# Patient Record
Sex: MALE | Race: WHITE | Employment: OTHER | URBAN - METROPOLITAN AREA
[De-identification: names, ages, dates, MRNs, and addresses within clinical notes are randomized per-mention and may not be internally consistent; named-entity substitution may affect disease eponyms.]

---

## 2017-06-01 ENCOUNTER — APPOINTMENT (OUTPATIENT)
Dept: CT IMAGING | Facility: HOSPITAL | Age: 52
End: 2017-06-01
Attending: EMERGENCY MEDICINE
Payer: COMMERCIAL

## 2017-06-01 ENCOUNTER — HOSPITAL ENCOUNTER (OUTPATIENT)
Facility: HOSPITAL | Age: 52
Setting detail: OBSERVATION
Discharge: HOME | End: 2017-06-04
Attending: EMERGENCY MEDICINE | Admitting: HOSPITALIST
Payer: COMMERCIAL

## 2017-06-01 DIAGNOSIS — N20.1 RIGHT URETERAL STONE: ICD-10-CM

## 2017-06-01 DIAGNOSIS — N20.0 KIDNEY STONE: Primary | ICD-10-CM

## 2017-06-01 PROCEDURE — 99203 OFFICE O/P NEW LOW 30 MIN: CPT | Performed by: UROLOGY

## 2017-06-01 PROCEDURE — 74176 CT ABD & PELVIS W/O CONTRAST: CPT | Performed by: EMERGENCY MEDICINE

## 2017-06-01 PROCEDURE — 99220 INITIAL OBSERVATION CARE,LEVL III: CPT | Performed by: HOSPITALIST

## 2017-06-01 RX ORDER — ALFUZOSIN HYDROCHLORIDE 10 MG/1
10 TABLET, EXTENDED RELEASE ORAL ONCE
Status: COMPLETED | OUTPATIENT
Start: 2017-06-01 | End: 2017-06-01

## 2017-06-01 RX ORDER — BISACODYL 10 MG
10 SUPPOSITORY, RECTAL RECTAL
Status: DISCONTINUED | OUTPATIENT
Start: 2017-06-01 | End: 2017-06-04

## 2017-06-01 RX ORDER — ONDANSETRON 2 MG/ML
4 INJECTION INTRAMUSCULAR; INTRAVENOUS ONCE
Status: COMPLETED | OUTPATIENT
Start: 2017-06-01 | End: 2017-06-01

## 2017-06-01 RX ORDER — SODIUM PHOSPHATE, DIBASIC AND SODIUM PHOSPHATE, MONOBASIC 7; 19 G/133ML; G/133ML
1 ENEMA RECTAL ONCE AS NEEDED
Status: DISCONTINUED | OUTPATIENT
Start: 2017-06-01 | End: 2017-06-04

## 2017-06-01 RX ORDER — HYDROCODONE BITARTRATE AND ACETAMINOPHEN 5; 325 MG/1; MG/1
1 TABLET ORAL EVERY 4 HOURS PRN
Status: DISCONTINUED | OUTPATIENT
Start: 2017-06-01 | End: 2017-06-04

## 2017-06-01 RX ORDER — ACETAMINOPHEN 325 MG/1
650 TABLET ORAL EVERY 6 HOURS PRN
Status: DISCONTINUED | OUTPATIENT
Start: 2017-06-01 | End: 2017-06-04

## 2017-06-01 RX ORDER — MORPHINE SULFATE 4 MG/ML
4 INJECTION, SOLUTION INTRAMUSCULAR; INTRAVENOUS ONCE
Status: COMPLETED | OUTPATIENT
Start: 2017-06-01 | End: 2017-06-01

## 2017-06-01 RX ORDER — SODIUM CHLORIDE 9 MG/ML
INJECTION, SOLUTION INTRAVENOUS CONTINUOUS
Status: DISCONTINUED | OUTPATIENT
Start: 2017-06-01 | End: 2017-06-04

## 2017-06-01 RX ORDER — ONDANSETRON 4 MG/1
4 TABLET, ORALLY DISINTEGRATING ORAL ONCE
Status: DISCONTINUED | OUTPATIENT
Start: 2017-06-01 | End: 2017-06-01

## 2017-06-01 RX ORDER — HYDROCODONE BITARTRATE AND ACETAMINOPHEN 5; 325 MG/1; MG/1
2 TABLET ORAL EVERY 4 HOURS PRN
Status: DISCONTINUED | OUTPATIENT
Start: 2017-06-01 | End: 2017-06-04

## 2017-06-01 RX ORDER — POTASSIUM CHLORIDE 20 MEQ/1
40 TABLET, EXTENDED RELEASE ORAL ONCE
Status: COMPLETED | OUTPATIENT
Start: 2017-06-01 | End: 2017-06-01

## 2017-06-01 RX ORDER — HYDROMORPHONE HYDROCHLORIDE 1 MG/ML
0.5 INJECTION, SOLUTION INTRAMUSCULAR; INTRAVENOUS; SUBCUTANEOUS ONCE
Status: COMPLETED | OUTPATIENT
Start: 2017-06-01 | End: 2017-06-01

## 2017-06-01 RX ORDER — MORPHINE SULFATE 4 MG/ML
INJECTION, SOLUTION INTRAMUSCULAR; INTRAVENOUS
Status: COMPLETED
Start: 2017-06-01 | End: 2017-06-01

## 2017-06-01 RX ORDER — HYDROMORPHONE HYDROCHLORIDE 1 MG/ML
0.8 INJECTION, SOLUTION INTRAMUSCULAR; INTRAVENOUS; SUBCUTANEOUS EVERY 2 HOUR PRN
Status: DISCONTINUED | OUTPATIENT
Start: 2017-06-01 | End: 2017-06-04

## 2017-06-01 RX ORDER — HYDROMORPHONE HYDROCHLORIDE 1 MG/ML
0.2 INJECTION, SOLUTION INTRAMUSCULAR; INTRAVENOUS; SUBCUTANEOUS EVERY 2 HOUR PRN
Status: DISCONTINUED | OUTPATIENT
Start: 2017-06-01 | End: 2017-06-04

## 2017-06-01 RX ORDER — ONDANSETRON 2 MG/ML
4 INJECTION INTRAMUSCULAR; INTRAVENOUS EVERY 6 HOURS PRN
Status: DISCONTINUED | OUTPATIENT
Start: 2017-06-01 | End: 2017-06-04

## 2017-06-01 RX ORDER — ONDANSETRON 2 MG/ML
INJECTION INTRAMUSCULAR; INTRAVENOUS
Status: COMPLETED
Start: 2017-06-01 | End: 2017-06-01

## 2017-06-01 RX ORDER — POLYETHYLENE GLYCOL 3350 17 G/17G
17 POWDER, FOR SOLUTION ORAL DAILY PRN
Status: DISCONTINUED | OUTPATIENT
Start: 2017-06-01 | End: 2017-06-04

## 2017-06-01 RX ORDER — HYDROMORPHONE HYDROCHLORIDE 1 MG/ML
0.4 INJECTION, SOLUTION INTRAMUSCULAR; INTRAVENOUS; SUBCUTANEOUS EVERY 2 HOUR PRN
Status: DISCONTINUED | OUTPATIENT
Start: 2017-06-01 | End: 2017-06-04

## 2017-06-01 RX ORDER — DOCUSATE SODIUM 100 MG/1
100 CAPSULE, LIQUID FILLED ORAL 2 TIMES DAILY
Status: DISCONTINUED | OUTPATIENT
Start: 2017-06-01 | End: 2017-06-04

## 2017-06-01 RX ORDER — HEPARIN SODIUM 5000 [USP'U]/ML
5000 INJECTION, SOLUTION INTRAVENOUS; SUBCUTANEOUS EVERY 12 HOURS SCHEDULED
Status: DISCONTINUED | OUTPATIENT
Start: 2017-06-01 | End: 2017-06-02

## 2017-06-01 RX ORDER — ACETAMINOPHEN 325 MG/1
650 TABLET ORAL EVERY 4 HOURS PRN
Status: DISCONTINUED | OUTPATIENT
Start: 2017-06-01 | End: 2017-06-04

## 2017-06-01 NOTE — ED PROVIDER NOTES
Patient Seen in: [facility] AND [facility] Emergency Department    History   Patient presents with:  Abdomen/Flank Pain (GI/)    Stated Complaint: kidney stone    HPI    70-year-old male with history of kidney stones presents for evaluation of right flank pain Device 06/01/17 0603 None (Room air)       Current:/75 mmHg  Pulse 91  Temp(Src) 97.5 °F (36.4 °C)  Resp 20  Ht 185.4 cm (6' 1\")  Wt 106.595 kg  BMI 31.01 kg/m2  SpO2 98%        Physical Exam   Constitutional: He is oriented to person, place, and ti gallbladder. Can't entirely exclude small gallstones. No evidence to suggest acute cholecystitis. No biliary ductal dilatation. 7. Small hiatal hernia 8.  Small umbilical and right inguinal hernias containing fat           Radiology exams  Viewed and review unknown. Patient continues to have pain despite multiple doses of IV pain medication. Discussed with and admitted to hospitalist.  Discussed with Dr. Vishal Nieves who recommends adding Uroxatrol. Patient updated at the bedside.     Disposition and Plan     Clin

## 2017-06-01 NOTE — ED NOTES
Right sided flank pain started yesterday afternoon, worse at 0200 with nausea and vomiting x1 and hematuria. Pt has a hx of kidney stones and lithotripsy.

## 2017-06-01 NOTE — ED NOTES
Dr. Lynn Vargas at bedside. Okay for patient to eat per dr. Yosi Beebe. Still awaiting bed assignment.

## 2017-06-01 NOTE — PROGRESS NOTES
06/01/17 1523   Clinical Encounter Type   Visited With Patient   Routine Visit Introduction   Patient Spiritual Encounters   Spiritual Assessment Completed 2     Patient declined spiritual care at this time.

## 2017-06-01 NOTE — ED NOTES
Report given to [place], 5th floor. Room being cleaned at this time. Lunch tray offered to patient. Pain under control at this time. All needs met.

## 2017-06-01 NOTE — ED NOTES
Assumed care of patient from [facility]. Back from CT, still in 10/10 pain. Additional 4 mg IVP morphine given.

## 2017-06-01 NOTE — PLAN OF CARE
Patient/Family Goals    • Patient/Family Long Term Goal Not Progressing    • Patient/Family Short Term Goal Not Progressing        Pt alert and orientated. Patient in bed, locked, call light within reach. PRN Norco given for pain.  PRN Zofran given for Emes

## 2017-06-01 NOTE — ED INITIAL ASSESSMENT (HPI)
Pt sts that he has kidney stone, c/o right flank pain + urinating blood.  Pt states he nauseous and vomited 6x in the last 3 hours d/t pain

## 2017-06-02 ENCOUNTER — APPOINTMENT (OUTPATIENT)
Dept: ULTRASOUND IMAGING | Facility: HOSPITAL | Age: 52
End: 2017-06-02
Attending: INTERNAL MEDICINE
Payer: COMMERCIAL

## 2017-06-02 ENCOUNTER — APPOINTMENT (OUTPATIENT)
Dept: GENERAL RADIOLOGY | Facility: HOSPITAL | Age: 52
End: 2017-06-02
Attending: UROLOGY
Payer: COMMERCIAL

## 2017-06-02 PROCEDURE — 74000 XR ABDOMEN (1 VIEW) (CPT=74000): CPT | Performed by: UROLOGY

## 2017-06-02 PROCEDURE — 99226 SUBSEQUENT OBSERVATION CARE: CPT | Performed by: INTERNAL MEDICINE

## 2017-06-02 PROCEDURE — 99212 OFFICE O/P EST SF 10 MIN: CPT | Performed by: UROLOGY

## 2017-06-02 PROCEDURE — 76770 US EXAM ABDO BACK WALL COMP: CPT | Performed by: INTERNAL MEDICINE

## 2017-06-02 PROCEDURE — 99226 SUBSEQUENT OBSERVATION CARE: CPT | Performed by: HOSPITALIST

## 2017-06-02 RX ORDER — LEVOFLOXACIN 5 MG/ML
500 INJECTION, SOLUTION INTRAVENOUS ONCE
Status: COMPLETED | OUTPATIENT
Start: 2017-06-03 | End: 2017-06-03

## 2017-06-02 RX ORDER — 0.9 % SODIUM CHLORIDE 0.9 %
VIAL (ML) INJECTION
Status: COMPLETED
Start: 2017-06-02 | End: 2017-06-02

## 2017-06-02 RX ORDER — HYDROCODONE BITARTRATE AND ACETAMINOPHEN 5; 325 MG/1; MG/1
1 TABLET ORAL EVERY 4 HOURS PRN
Qty: 20 TABLET | Refills: 0 | Status: SHIPPED | OUTPATIENT
Start: 2017-06-02 | End: 2017-06-26 | Stop reason: ALTCHOICE

## 2017-06-02 NOTE — PLAN OF CARE
GENITOURINARY - ADULT    • Absence of urinary retention Progressing        Patient/Family Goals    • Patient/Family Long Term Goal Progressing    • Patient/Family Short Term Goal Progressing        Pt alert and orientated. Pt ambulating in room frequently.

## 2017-06-02 NOTE — PROGRESS NOTES
[facility] Patient Status:  Observation    1965 MRN [id number]   Location [facility] Attending Rach Banda.  [address] Day # 1 PCP Josiane Fernandez. DO Rayray Gregorio Ano is a 46year old male patient. HPI:  1. docusate sodium (COLACE) cap 100 mg 100 mg Oral BID   PEG 3350 (MIRALAX) powder packet 17 g 17 g Oral Daily PRN   magnesium hydroxide (MILK OF MAGNESIA) 400 MG/5ML suspension 30 mL 30 mL Oral Daily PRN   bisacodyl (DULCOLAX) rectal suppository 10 mg 10 m Normal size gallbladder. Can't entirely exclude small gallstones. No evidence to suggest acute cholecystitis. No biliary ductal dilatation. 7. Small hiatal hernia 8.  Small umbilical and right inguinal hernias containing fat                 PHYSICAL EXAM: N

## 2017-06-02 NOTE — PLAN OF CARE
Problem: Patient/Family Goals  Goal: Patient/Family Long Term Goal  Patient’s Long Term Goal: To pass Kidney Stone/ Return to baseline Urinary Function    Interventions:  - Urology on consult  - Continuous IV Fluids   -Encourage Oral fluids   - See additio

## 2017-06-02 NOTE — PROGRESS NOTES
[facility]    Progress Note    [facility] Patient Status:  Observation    1965 MRN [id number]   Location [facility] Attending Arzella Saint, MD   Hosp Day # 1 PCP Eva Fairbanks.  DO Darline       SUBJECTIVE:  Felt increas suspicious renal masses. No left-sided hydronephrosis. 3. Small bladder with mild diffuse wall thickening and/or trabeculation. No gross focal bladder mass/lesion or calculus. 4. Mildly enlarged prostate for age.  Followup urologic evaluation recommended as Excessive drinking alcohol     Kidney stone      Plan:     Renal colic with ureteral calculi on the right  - seen by urology  - will cont IVFs  - IV dilaudid prn pain  - IV zofran prn nausea  - strain urine  - due to worsening pain this morning patient wou

## 2017-06-02 NOTE — CONSULTS
[facility] - [facility]    Report of Consultation    Date of Admission:  6/1/2017  Date of Consult:  6/2/2017   Reason for Consultation:     CHAO    History of Present Illness:     Peg Pu is a 46 yrs old male with pmh of CORNELIUS, recurrent nephrolithia tablet Oral Q4H PRN   HYDROmorphone HCl PF (DILAUDID) 1 MG/ML injection 0.2 mg 0.2 mg Intravenous Q2H PRN   Or      HYDROmorphone HCl PF (DILAUDID) 1 MG/ML injection 0.4 mg 0.4 mg Intravenous Q2H PRN   Or      HYDROmorphone HCl PF (DILAUDID) 1 MG/ML inject Intake   2640 ml   Output   1720 ml   Net    920 ml     Wt Readings from Last 5 Encounters:  06/01/17 : 235 lb (106.595 kg)  08/04/16 : 234 lb 12.8 oz (106.505 kg)  06/14/16 : 238 lb (107.956 kg)  03/07/16 : 235 lb (106.595 kg)  02/23/13 : 229 lb (103.87 for  the monitoring of oral anticoagulant therapy. Recommended therapeutic ranges for anticoagulant therapy are  as follows:  2.0 - 3.0 All indications except for mechanical prosthetic  cardiac valves. 2.5 - 3.5 Mechanical prosthetic cardiac valves.

## 2017-06-03 ENCOUNTER — ANESTHESIA (OUTPATIENT)
Dept: SURGERY | Facility: HOSPITAL | Age: 52
End: 2017-06-03
Payer: COMMERCIAL

## 2017-06-03 ENCOUNTER — APPOINTMENT (OUTPATIENT)
Dept: GENERAL RADIOLOGY | Facility: HOSPITAL | Age: 52
End: 2017-06-03
Attending: UROLOGY
Payer: COMMERCIAL

## 2017-06-03 ENCOUNTER — SURGERY (OUTPATIENT)
Age: 52
End: 2017-06-03

## 2017-06-03 ENCOUNTER — ANESTHESIA EVENT (OUTPATIENT)
Dept: SURGERY | Facility: HOSPITAL | Age: 52
End: 2017-06-03
Payer: COMMERCIAL

## 2017-06-03 PROCEDURE — 52332 CYSTOSCOPY AND TREATMENT: CPT | Performed by: UROLOGY

## 2017-06-03 PROCEDURE — BT1DZZZ FLUOROSCOPY OF RIGHT KIDNEY, URETER AND BLADDER: ICD-10-PCS | Performed by: UROLOGY

## 2017-06-03 PROCEDURE — 99226 SUBSEQUENT OBSERVATION CARE: CPT | Performed by: UROLOGY

## 2017-06-03 PROCEDURE — 0T768DZ DILATION OF RIGHT URETER WITH INTRALUMINAL DEVICE, VIA NATURAL OR ARTIFICIAL OPENING ENDOSCOPIC: ICD-10-PCS | Performed by: UROLOGY

## 2017-06-03 PROCEDURE — 74420 UROGRAPHY RTRGR +-KUB: CPT | Performed by: UROLOGY

## 2017-06-03 PROCEDURE — 99226 SUBSEQUENT OBSERVATION CARE: CPT | Performed by: HOSPITALIST

## 2017-06-03 PROCEDURE — 99225 SUBSEQUENT OBSERVATION CARE: CPT | Performed by: INTERNAL MEDICINE

## 2017-06-03 DEVICE — STENT URET 6F 26CM WO GW INL: Type: IMPLANTABLE DEVICE | Status: FUNCTIONAL

## 2017-06-03 RX ORDER — LIDOCAINE HYDROCHLORIDE 10 MG/ML
INJECTION, SOLUTION EPIDURAL; INFILTRATION; INTRACAUDAL; PERINEURAL AS NEEDED
Status: DISCONTINUED | OUTPATIENT
Start: 2017-06-03 | End: 2017-06-03 | Stop reason: SURG

## 2017-06-03 RX ORDER — MIDAZOLAM HYDROCHLORIDE 1 MG/ML
INJECTION INTRAMUSCULAR; INTRAVENOUS AS NEEDED
Status: DISCONTINUED | OUTPATIENT
Start: 2017-06-03 | End: 2017-06-03 | Stop reason: SURG

## 2017-06-03 RX ORDER — GENTAMICIN SULFATE 40 MG/ML
INJECTION, SOLUTION INTRAMUSCULAR; INTRAVENOUS AS NEEDED
Status: DISCONTINUED | OUTPATIENT
Start: 2017-06-03 | End: 2017-06-03 | Stop reason: HOSPADM

## 2017-06-03 RX ORDER — DEXAMETHASONE SODIUM PHOSPHATE 4 MG/ML
VIAL (ML) INJECTION AS NEEDED
Status: DISCONTINUED | OUTPATIENT
Start: 2017-06-03 | End: 2017-06-03 | Stop reason: SURG

## 2017-06-03 RX ORDER — MORPHINE SULFATE 2 MG/ML
2 INJECTION, SOLUTION INTRAMUSCULAR; INTRAVENOUS EVERY 4 HOURS PRN
Status: DISCONTINUED | OUTPATIENT
Start: 2017-06-03 | End: 2017-06-04

## 2017-06-03 RX ORDER — LEVOFLOXACIN 5 MG/ML
500 INJECTION, SOLUTION INTRAVENOUS
Status: DISCONTINUED | OUTPATIENT
Start: 2017-06-05 | End: 2017-06-04

## 2017-06-03 RX ORDER — MORPHINE SULFATE 2 MG/ML
2 INJECTION, SOLUTION INTRAMUSCULAR; INTRAVENOUS EVERY 10 MIN PRN
Status: DISCONTINUED | OUTPATIENT
Start: 2017-06-03 | End: 2017-06-03 | Stop reason: HOSPADM

## 2017-06-03 RX ORDER — HYDROMORPHONE HYDROCHLORIDE 1 MG/ML
0.2 INJECTION, SOLUTION INTRAMUSCULAR; INTRAVENOUS; SUBCUTANEOUS EVERY 5 MIN PRN
Status: DISCONTINUED | OUTPATIENT
Start: 2017-06-03 | End: 2017-06-03 | Stop reason: HOSPADM

## 2017-06-03 RX ORDER — HYDROCODONE BITARTRATE AND ACETAMINOPHEN 5; 325 MG/1; MG/1
1 TABLET ORAL AS NEEDED
Status: DISCONTINUED | OUTPATIENT
Start: 2017-06-03 | End: 2017-06-03 | Stop reason: HOSPADM

## 2017-06-03 RX ORDER — MORPHINE SULFATE 4 MG/ML
4 INJECTION, SOLUTION INTRAMUSCULAR; INTRAVENOUS EVERY 10 MIN PRN
Status: DISCONTINUED | OUTPATIENT
Start: 2017-06-03 | End: 2017-06-03 | Stop reason: HOSPADM

## 2017-06-03 RX ORDER — ONDANSETRON 2 MG/ML
INJECTION INTRAMUSCULAR; INTRAVENOUS AS NEEDED
Status: DISCONTINUED | OUTPATIENT
Start: 2017-06-03 | End: 2017-06-03 | Stop reason: SURG

## 2017-06-03 RX ORDER — 0.9 % SODIUM CHLORIDE 0.9 %
VIAL (ML) INJECTION
Status: COMPLETED
Start: 2017-06-03 | End: 2017-06-03

## 2017-06-03 RX ORDER — MORPHINE SULFATE 2 MG/ML
1 INJECTION, SOLUTION INTRAMUSCULAR; INTRAVENOUS EVERY 4 HOURS PRN
Status: DISCONTINUED | OUTPATIENT
Start: 2017-06-03 | End: 2017-06-04

## 2017-06-03 RX ORDER — NALOXONE HYDROCHLORIDE 0.4 MG/ML
80 INJECTION, SOLUTION INTRAMUSCULAR; INTRAVENOUS; SUBCUTANEOUS AS NEEDED
Status: DISCONTINUED | OUTPATIENT
Start: 2017-06-03 | End: 2017-06-03 | Stop reason: HOSPADM

## 2017-06-03 RX ORDER — SODIUM CHLORIDE, SODIUM LACTATE, POTASSIUM CHLORIDE, CALCIUM CHLORIDE 600; 310; 30; 20 MG/100ML; MG/100ML; MG/100ML; MG/100ML
INJECTION, SOLUTION INTRAVENOUS CONTINUOUS
Status: DISCONTINUED | OUTPATIENT
Start: 2017-06-03 | End: 2017-06-04

## 2017-06-03 RX ORDER — HYDROCODONE BITARTRATE AND ACETAMINOPHEN 5; 325 MG/1; MG/1
2 TABLET ORAL AS NEEDED
Status: DISCONTINUED | OUTPATIENT
Start: 2017-06-03 | End: 2017-06-03 | Stop reason: HOSPADM

## 2017-06-03 RX ORDER — HALOPERIDOL 5 MG/ML
0.25 INJECTION INTRAMUSCULAR ONCE AS NEEDED
Status: DISCONTINUED | OUTPATIENT
Start: 2017-06-03 | End: 2017-06-03 | Stop reason: HOSPADM

## 2017-06-03 RX ORDER — HYDROCODONE BITARTRATE AND ACETAMINOPHEN 5; 325 MG/1; MG/1
1 TABLET ORAL EVERY 6 HOURS PRN
Status: DISCONTINUED | OUTPATIENT
Start: 2017-06-03 | End: 2017-06-04

## 2017-06-03 RX ORDER — HYDROMORPHONE HYDROCHLORIDE 1 MG/ML
0.4 INJECTION, SOLUTION INTRAMUSCULAR; INTRAVENOUS; SUBCUTANEOUS EVERY 5 MIN PRN
Status: DISCONTINUED | OUTPATIENT
Start: 2017-06-03 | End: 2017-06-03 | Stop reason: HOSPADM

## 2017-06-03 RX ORDER — HYDROMORPHONE HYDROCHLORIDE 1 MG/ML
0.6 INJECTION, SOLUTION INTRAMUSCULAR; INTRAVENOUS; SUBCUTANEOUS EVERY 5 MIN PRN
Status: DISCONTINUED | OUTPATIENT
Start: 2017-06-03 | End: 2017-06-03 | Stop reason: HOSPADM

## 2017-06-03 RX ORDER — MORPHINE SULFATE 10 MG/ML
6 INJECTION, SOLUTION INTRAMUSCULAR; INTRAVENOUS EVERY 10 MIN PRN
Status: DISCONTINUED | OUTPATIENT
Start: 2017-06-03 | End: 2017-06-03 | Stop reason: HOSPADM

## 2017-06-03 RX ORDER — ONDANSETRON 2 MG/ML
4 INJECTION INTRAMUSCULAR; INTRAVENOUS ONCE AS NEEDED
Status: DISCONTINUED | OUTPATIENT
Start: 2017-06-03 | End: 2017-06-03 | Stop reason: HOSPADM

## 2017-06-03 RX ORDER — ALFUZOSIN HYDROCHLORIDE 10 MG/1
10 TABLET, EXTENDED RELEASE ORAL DAILY
Status: DISCONTINUED | OUTPATIENT
Start: 2017-06-03 | End: 2017-06-04

## 2017-06-03 RX ORDER — SODIUM CHLORIDE, SODIUM LACTATE, POTASSIUM CHLORIDE, CALCIUM CHLORIDE 600; 310; 30; 20 MG/100ML; MG/100ML; MG/100ML; MG/100ML
INJECTION, SOLUTION INTRAVENOUS CONTINUOUS PRN
Status: DISCONTINUED | OUTPATIENT
Start: 2017-06-03 | End: 2017-06-03

## 2017-06-03 RX ADMIN — DEXAMETHASONE SODIUM PHOSPHATE 4 MG: 4 MG/ML VIAL (ML) INJECTION at 12:47:00

## 2017-06-03 RX ADMIN — SODIUM CHLORIDE: 9 INJECTION, SOLUTION INTRAVENOUS at 13:45:00

## 2017-06-03 RX ADMIN — MIDAZOLAM HYDROCHLORIDE 2 MG: 1 INJECTION INTRAMUSCULAR; INTRAVENOUS at 12:47:00

## 2017-06-03 RX ADMIN — LEVOFLOXACIN 500 MG: 5 INJECTION, SOLUTION INTRAVENOUS at 12:52:00

## 2017-06-03 RX ADMIN — LIDOCAINE HYDROCHLORIDE 50 MG: 10 INJECTION, SOLUTION EPIDURAL; INFILTRATION; INTRACAUDAL; PERINEURAL at 12:47:00

## 2017-06-03 RX ADMIN — ONDANSETRON 4 MG: 2 INJECTION INTRAMUSCULAR; INTRAVENOUS at 13:25:00

## 2017-06-03 RX ADMIN — SODIUM CHLORIDE: 9 INJECTION, SOLUTION INTRAVENOUS at 12:47:00

## 2017-06-03 NOTE — ANESTHESIA PREPROCEDURE EVALUATION
Anesthesia PreOp Note    HPI:     Josh Ray is a 46year old male who presents for preoperative consultation requested by: Sherri Peterson MD    Date of Surgery: 6/1/2017 - 6/3/2017    Procedure(s):  CYSTOSCOPY RETROGRADE  CYSTOSCOPY STONE Bryson Single 2 tablet 2 tablet Oral Q4H PRN Jo Tran MD 2 tablet at 06/02/17 2222   HYDROmorphone HCl PF (DILAUDID) 1 MG/ML injection 0.2 mg 0.2 mg Intravenous Q2H PRN Jo Tran MD    Or        HYDROmorphone HCl PF (DILAUDID) 1 MG/ML injection 0.4 mg 0.4 Yes    Comment: Soda > 6 cups daily     Social History Narrative       Available pre-op labs reviewed.     Lab Results  Component Value Date   WBC 11.0 06/02/2017   RBC 4.59 06/02/2017   HGB 14.2 06/02/2017   HCT 41.6 06/02/2017   MCV 90.6 06/02/2017   [facility]  of the nature of the anesthetic plan, benefits, risks, major complications, and any alternative forms of anesthetic management. All of the patient's questions were answered to the best of my ability.  The patient desires the anesthetic alyson

## 2017-06-03 NOTE — PROGRESS NOTES
[facility]    Progress Note    [facility]:  [facility]    1965 MRN [id number]   Location [facility] Attending Brenden Romero MD   Hosp Day # 2 PCP Clementina Gregorio DO       SUBJECTIVE:  Felt increas ureter. Findings are new when compared to most recent study of May 21, 2011. 2. Additional small bilateral intrarenal calculi with decrease size in extent/decreased stone mode when compared to prior study. No suspicious renal masses.  No left-sided hydronep Intravenous Q10 Min PRN   Atropine Sulfate 0.1 MG/ML injection 0.5 mg 0.5 mg Intravenous PRN   ondansetron HCl (ZOFRAN) injection 4 mg 4 mg Intravenous Once PRN   Prochlorperazine Edisylate (COMPAZINE) injection 5 mg 5 mg Intravenous Once PRN   haloperidol failure ([address]      Plan:     Renal colic with ureteral calculi on the right  - seen by urology  - will cont IVFs  - IV dilaudid prn pain  - IV zofran prn nausea  - strain urine  - cystoscopy with right retrograde pyelogram x-ray, insertion of right [place]

## 2017-06-03 NOTE — PROGRESS NOTES
[facility]    Progress Note      Subjective:     Feels better .  S/p right ureteral stent     Review of Systems:     Constitutional: negative for fatigue, fevers and weight loss  Eyes: negative for irritation, redness and visual disturbanc MG/ML injection 2 mg 2 mg Intravenous Q4H PRN   lactated ringers infusion  Intravenous Continuous   [START ON 6/5/2017] levofloxacin in D5W (LEVAQUIN) IVPB premix 500 mg 500 mg Intravenous Q48H   HYDROcodone-acetaminophen (NORCO) 5-325 MG per tab 1 tablet --    BILT  0.8   --    --    TP  7.3   --    --        PTT   Date Value Ref Range Status   06/02/2017 26.9 23.2-35.3 seconds Final   ----------  INR   Date Value Ref Range Status   06/02/2017 1.1 0.9-1.2 Final   Comment:     Only the INR (not the Protim noted with right 6 mm obstructing stone with hydronephrosis   - renal US negative for hydronephrosis  - creatinine continue to rise and will likely improve post stent - recheck am   - continue to increase po intake   - UA +ve blood likely secondary to neph

## 2017-06-03 NOTE — BRIEF OP NOTE
[facility] POST ANESTHESIA CARE UNIT  Brief Op Note       Patients Name: Phylicia Clark  Attending Physician: Nico Glover MD  Operating Physician: Preston Clement MD  CSN: [id number]     Location:  OR  MRN: [id number]    YOB: 1965

## 2017-06-03 NOTE — PROGRESS NOTES
Rayray Rosas is a 46year old male.     HPI:   Patient presents with:  Abdomen/Flank Pain (GI/)    COVERING DR. Erica Fiore WHO IS AWAY THIS WEEKEND    History provided by patient; sister who is present in the room; girlfriend was present in the room    Pain-- 650 mg, 650 mg, Oral, Q6H PRN  •  acetaminophen (TYLENOL) tab 650 mg, 650 mg, Oral, Q4H PRN **OR** HYDROcodone-acetaminophen (NORCO) 5-325 MG per tab 1 tablet, 1 tablet, Oral, Q4H PRN **OR** HYDROcodone-acetaminophen (NORCO) 5-325 MG per tab 2 tablet, 2 ta 2   BACUR Negative Negative         Hematology:    WBC   Date Value Ref Range Status   06/02/2017 11.0 4.0-11.0 K/UL Final   ----------  HGB   Date Value Ref Range Status   06/02/2017 14.2 13.5-17.5 g/dL Final   ----------  HCT   Date Value Ref Range Statu DICTATED BY (CST): Amy Pablo MD ON 6/02/2017 AT 16:43     APPROVED BY (CST): Amy Pablo MD ON 6/02/2017 AT 16:47              6/1/2017  PROCEDURE:   CT ABDOMEN + PELVIS KIDNEYSTONE 2D RNDR (NO IV NO ORAL) (CPT=74176)  COMPARISON: US ABDOMEN L visualized calcified gallstones; can't exclude possible tiny gallstones on the basis of this study. No apparent wall thickening or pericholecystic fluid BILIARY: No visible intra-or extra hepatic ductal dilatation or visualized intraductal calcification. trabeculation. No gross focal bladder mass/lesion or calculus. 4. Mildly enlarged prostate for age. Followup urologic evaluation recommended as clinically indicated. 5. Hepatomegaly versus Riedel's lobe variant.  Diffuse hepatic steatosis with areas of foca anesthesia or general anesthesia and I answer patient's questions on treatment and patient understands decides that he wants to proceed.   I make it clear to patient that he should be able to go home today and that he needs to see Dr. Stephen Luciano in the office ne

## 2017-06-03 NOTE — ANESTHESIA POSTPROCEDURE EVALUATION
Patient: Phylicia Clark    Procedure Summary     Date Anesthesia Start Anesthesia Stop Room / Location    06/03/17 1246 1346 300 [facility] MAIN OR 14 / 300 [facility] MAIN OR       Procedure Diagnosis Surgeon Responsible Provider    CYSTOSCOPY RETROGRADE (Right ); CYSTOSCOPY ST

## 2017-06-03 NOTE — PLAN OF CARE
GENITOURINARY - ADULT    • Absence of urinary retention Progressing        PAIN - ADULT    • Verbalizes/displays adequate comfort level or patient's stated pain goal Progressing        Patient/Family Goals    • Patient/Family Long Term Goal; to stay free o

## 2017-06-04 VITALS
RESPIRATION RATE: 18 BRPM | DIASTOLIC BLOOD PRESSURE: 75 MMHG | OXYGEN SATURATION: 95 % | SYSTOLIC BLOOD PRESSURE: 121 MMHG | WEIGHT: 234.81 LBS | BODY MASS INDEX: 31.12 KG/M2 | HEIGHT: 73 IN | TEMPERATURE: 99 F | HEART RATE: 68 BPM

## 2017-06-04 PROCEDURE — 99225 SUBSEQUENT OBSERVATION CARE: CPT | Performed by: INTERNAL MEDICINE

## 2017-06-04 PROCEDURE — 99226 SUBSEQUENT OBSERVATION CARE: CPT | Performed by: UROLOGY

## 2017-06-04 PROCEDURE — 99217 OBSERVATION CARE DISCHARGE: CPT | Performed by: HOSPITALIST

## 2017-06-04 RX ORDER — LEVOFLOXACIN 500 MG/1
500 TABLET, FILM COATED ORAL
Qty: 3 TABLET | Refills: 0 | Status: SHIPPED | OUTPATIENT
Start: 2017-06-04 | End: 2017-06-11

## 2017-06-04 RX ORDER — ALFUZOSIN HYDROCHLORIDE 10 MG/1
10 TABLET, EXTENDED RELEASE ORAL DAILY
Qty: 30 TABLET | Refills: 0 | Status: SHIPPED | OUTPATIENT
Start: 2017-06-04 | End: 2017-06-26 | Stop reason: ALTCHOICE

## 2017-06-04 NOTE — PROGRESS NOTES
Jose Palomares is a 46year old male.     HPI:   Patient presents with:  Abdomen/Flank Pain (GI/)    COVERING DR. Elliott Parent WHO IS AWAY THIS WEEKEND    History provided by patient;   girlfriend was present with patient    6/3/17 cystoscopy with right retrograd sulfate (PF) 2 MG/ML injection 2 mg, 2 mg, Intravenous, Q4H PRN  •  lactated ringers infusion, , Intravenous, Continuous  •  [START ON 6/5/2017] levofloxacin in D5W (LEVAQUIN) IVPB premix 500 mg, 500 mg, Intravenous, Q48H  •  HYDROcodone-acetaminophen (NOR 06/02/17  0559 06/03/17  0849 06/04/17  0537   BUN 19 25* 25* 21*   CREATSERUM 1.86* 2.39* 2.64* 1.64*   BUNCREA 10.2 10.5 9.5* 12.8   GFRAA 46* 35* 31* 54*   GFRNAA 38* 29* 26* 44*       Urinalysis Results (last three years):    Recent Labs   06/01/17  06 ureteral calculus. 3. 0.1 minutes of fluoroscopy was utilized.          6/2/2017  XR ABDOMEN (1 VIEW) CT ABDOMEN + PELVIS KIDNEYSTONE 2D RNDR (NO IV NO ORAL) (CPT=741, 6/01/2017, 7:02.  FINDINGS:        BOWEL GAS PATTERN:            Normal. Moderate amount [facility], CT PF RENAL STONE ABD/P WO CON, 5/08/2011, 23:06.  [employer], [place] PF RENAL STONE ABD/P WO CON, 5/21/2011, 7:54.  INDICATIONS:            RT flank pain, gross hematuria, history of kidney stone.  G dilatation or visualized intraductal calcification.  SPLEEN:  No enlargement or focal lesion.  STOMACH:            Small hiatal hernia. No gross gastric mass, obstruction or focal abnormality.  Duodenum grossly unremarkable.  PANCREAS:       Negative noncon evaluation recommended as clinically indicated. 5. Hepatomegaly versus Riedel's lobe variant. Diffuse hepatic steatosis with areas of focal fatty sparing as described. No focal suspicious masses. 6. Normal size gallbladder.  Can't entirely exclude small gal expect renal function to improve further; follow-up with Dr. Varela Pickup    3.  History of multiple, multiple kidney stones in the past  Ultimately, after definitive treatment of current right ureteral stone, would likely benefit from metabolic 24 urine collecti

## 2017-06-04 NOTE — OPERATIVE REPORT
[facility]    PATIENT'S NAME: Denise Ibrahim   ATTENDING PHYSICIAN: Hector Melo MD   OPERATING PHYSICIAN: Ju Mitchell MD   PATIENT ACCOUNT#:   [id number]    LOCATION:  [address] [place] [address] [address] #:   [id number]       DATE OF BIRTH:  1 impacted. Initially it was difficult getting the Glidewire passed, but ultimately successful, and with some mild difficulty, able to place a right ureteral stent which was positioned well. The stone does appear to be only mildly radiopaque.   The recommen was preoperatively before patient received any anesthesia. Dictated By Kp Barnett MD  d: 06/03/2017 13:35:33  t: 06/03/2017 19:33:29  Job [id number]  PVK/    cc: Isabel Santana.  MD Rosalba Todd MD Scharlene Johann, MD

## 2017-06-04 NOTE — DISCHARGE SUMMARY
[facility]    Discharge Summary    [facility] Patient Status:  Observation    1965 MRN [id number]   Location [facility] Attending Miguel Marcos MD   Hosp Day # 3 PCP Jose Antonio Ramsey.  DO Darline     Date of Admission:  then around 230 am he awake again with severe right flank pain.  He came to the ED for evaluation.  In the ED he had a CT scan showing a 6mm right proximal ureteral stone and other bilateral stones.  He was admitted for pain control.     Hospital Course: levofloxacin 500 MG Tabs      Please  your prescriptions at the location directed by your doctor or nurse     Bring a paper prescription for each of these medications    - HYDROcodone-acetaminophen 5-325 MG Tabs              Karen Oates  6/4/2017

## 2017-06-05 ENCOUNTER — TELEPHONE (OUTPATIENT)
Dept: INTERNAL MEDICINE UNIT | Facility: HOSPITAL | Age: 52
End: 2017-06-05

## 2017-06-05 NOTE — TELEPHONE ENCOUNTER
Patient discharged from [facility] on June 4, 2017. Please call patient to schedule hospital follow-up appointment with PCP, Dr. Meredith Gonzalez.

## 2017-06-12 ENCOUNTER — OFFICE VISIT (OUTPATIENT)
Dept: FAMILY MEDICINE CLINIC | Facility: CLINIC | Age: 52
End: 2017-06-12

## 2017-06-12 VITALS
TEMPERATURE: 98 F | WEIGHT: 233 LBS | DIASTOLIC BLOOD PRESSURE: 88 MMHG | SYSTOLIC BLOOD PRESSURE: 131 MMHG | BODY MASS INDEX: 31 KG/M2 | HEART RATE: 77 BPM

## 2017-06-12 DIAGNOSIS — N20.0 RECURRENT NEPHROLITHIASIS: Primary | ICD-10-CM

## 2017-06-12 DIAGNOSIS — N17.9 ACUTE RENAL FAILURE, UNSPECIFIED ACUTE RENAL FAILURE TYPE (HCC): ICD-10-CM

## 2017-06-12 PROCEDURE — 99212 OFFICE O/P EST SF 10 MIN: CPT | Performed by: FAMILY MEDICINE

## 2017-06-12 PROCEDURE — 99213 OFFICE O/P EST LOW 20 MIN: CPT | Performed by: FAMILY MEDICINE

## 2017-06-12 NOTE — PROGRESS NOTES
Pt had stent  Rt side stone  Hs of stones. No fever. Had acute renal failure. Patient's past medical surgical family social history was reviewed.     Review of Systems  Allergic: no environmental allergies or food allergies  Cardiovascular: no ches

## 2017-06-13 ENCOUNTER — TELEPHONE (OUTPATIENT)
Dept: SURGERY | Facility: CLINIC | Age: 52
End: 2017-06-13

## 2017-06-14 NOTE — TELEPHONE ENCOUNTER
Left message for patient to call the office and speak with the nurse appointment available for tomorrow morning at 8:40am. ZOLTAN  If patient returns call please offer appointment being held for tomorrow with Dr. Tiffanie Purvis @ 8:40 and slot accordingly if patient a

## 2017-06-15 NOTE — TELEPHONE ENCOUNTER
Patient calling again is upset because no one has been following up and calling him back. Tried to get RN on line. ZOLTAN spoke with MA that states RN will call patient back today. Please advise.  Thank you

## 2017-06-15 NOTE — TELEPHONE ENCOUNTER
I spoke with pt and gave him an appt for tues. 6/20 at 2:20pm and he said that he could not accept an appt for the next morning when called after 4pm because he is a  and needs more notice.

## 2017-06-17 ENCOUNTER — APPOINTMENT (OUTPATIENT)
Dept: LAB | Age: 52
End: 2017-06-17
Attending: FAMILY MEDICINE
Payer: COMMERCIAL

## 2017-06-17 DIAGNOSIS — N17.9 ACUTE RENAL FAILURE, UNSPECIFIED ACUTE RENAL FAILURE TYPE (HCC): ICD-10-CM

## 2017-06-17 PROCEDURE — 80048 BASIC METABOLIC PNL TOTAL CA: CPT

## 2017-06-17 PROCEDURE — 36415 COLL VENOUS BLD VENIPUNCTURE: CPT

## 2017-06-20 ENCOUNTER — TELEPHONE (OUTPATIENT)
Dept: SURGERY | Facility: CLINIC | Age: 52
End: 2017-06-20

## 2017-06-20 ENCOUNTER — OFFICE VISIT (OUTPATIENT)
Dept: SURGERY | Facility: CLINIC | Age: 52
End: 2017-06-20

## 2017-06-20 VITALS
TEMPERATURE: 98 F | SYSTOLIC BLOOD PRESSURE: 131 MMHG | HEART RATE: 69 BPM | HEIGHT: 73 IN | DIASTOLIC BLOOD PRESSURE: 80 MMHG | RESPIRATION RATE: 16 BRPM | BODY MASS INDEX: 30.48 KG/M2 | WEIGHT: 230 LBS

## 2017-06-20 DIAGNOSIS — N20.1 URETERAL CALCULI: Primary | ICD-10-CM

## 2017-06-20 PROCEDURE — 99212 OFFICE O/P EST SF 10 MIN: CPT | Performed by: UROLOGY

## 2017-06-20 PROCEDURE — 99214 OFFICE O/P EST MOD 30 MIN: CPT | Performed by: UROLOGY

## 2017-06-20 PROCEDURE — 82365 CALCULUS SPECTROSCOPY: CPT | Performed by: UROLOGY

## 2017-06-20 PROCEDURE — 88300 SURGICAL PATH GROSS: CPT | Performed by: UROLOGY

## 2017-06-20 NOTE — TELEPHONE ENCOUNTER
Patient seen in office, scheduled for cysto, with removal of right double j stent, on 07/07/17 @ 10:00, at [facility]/outpatient.

## 2017-06-20 NOTE — PROGRESS NOTES
[facility] Patient Status:  Outpatient    1965 MRN [id number]   Location [address] [facility] Attending Yassine Portal. [address] Day #  PCP Yadira Comment.  DO Jose Gregorio is a 46 yea (104.327 kg)          PHYSICAL EXAM: Besides vital signs physical exam not performed it was just done at recent consultation June 1, 2017 as an inpatient      ASSESSMENT AND PLAN:  Case summary: Patient is a 60-year-old white male history of recurrent kidn

## 2017-06-26 ENCOUNTER — OFFICE VISIT (OUTPATIENT)
Dept: FAMILY MEDICINE CLINIC | Facility: CLINIC | Age: 52
End: 2017-06-26

## 2017-06-26 VITALS
BODY MASS INDEX: 31 KG/M2 | SYSTOLIC BLOOD PRESSURE: 134 MMHG | WEIGHT: 238 LBS | DIASTOLIC BLOOD PRESSURE: 89 MMHG | HEART RATE: 84 BPM

## 2017-06-26 DIAGNOSIS — N17.9 ACUTE RENAL FAILURE, UNSPECIFIED ACUTE RENAL FAILURE TYPE (HCC): ICD-10-CM

## 2017-06-26 DIAGNOSIS — Z12.5 SCREENING FOR PROSTATE CANCER: ICD-10-CM

## 2017-06-26 DIAGNOSIS — N20.0 CALCIUM OXALATE STONES: ICD-10-CM

## 2017-06-26 DIAGNOSIS — N20.0 RECURRENT NEPHROLITHIASIS: Primary | ICD-10-CM

## 2017-06-26 DIAGNOSIS — E78.5 HYPERLIPIDEMIA, UNSPECIFIED HYPERLIPIDEMIA TYPE: ICD-10-CM

## 2017-06-26 DIAGNOSIS — N21.0 URIC ACID BLADDER STONE: ICD-10-CM

## 2017-06-26 DIAGNOSIS — R73.01 IMPAIRED FASTING GLUCOSE: ICD-10-CM

## 2017-06-26 PROCEDURE — 99213 OFFICE O/P EST LOW 20 MIN: CPT | Performed by: FAMILY MEDICINE

## 2017-06-26 PROCEDURE — 99212 OFFICE O/P EST SF 10 MIN: CPT | Performed by: FAMILY MEDICINE

## 2017-06-26 RX ORDER — ALLOPURINOL 300 MG/1
300 TABLET ORAL DAILY
Qty: 90 TABLET | Refills: 3 | Status: SHIPPED | OUTPATIENT
Start: 2017-06-26 | End: 2019-04-03

## 2017-06-26 NOTE — PROGRESS NOTES
Had calcium and urica acid stones  Weigh up   Not eating  As much red meat    Still has stents      Patient's past medical surgical family social history was reviewed. Review of Systems  Constitutional: no fever or fatigue.    Endocrine no cold intoleran

## 2017-06-27 ENCOUNTER — APPOINTMENT (OUTPATIENT)
Dept: LAB | Age: 52
End: 2017-06-27
Attending: FAMILY MEDICINE
Payer: COMMERCIAL

## 2017-06-27 DIAGNOSIS — N20.0 RECURRENT NEPHROLITHIASIS: ICD-10-CM

## 2017-06-27 DIAGNOSIS — N21.0 URIC ACID BLADDER STONE: ICD-10-CM

## 2017-06-27 LAB — URATE SERPL-MCNC: 7.9 MG/DL (ref 3.3–8.7)

## 2017-06-27 PROCEDURE — 84550 ASSAY OF BLOOD/URIC ACID: CPT

## 2017-06-27 PROCEDURE — 36415 COLL VENOUS BLD VENIPUNCTURE: CPT

## 2017-07-06 NOTE — H&P
[facility]    PATIENT'S NAME: Barrett Frasers   ATTENDING PHYSICIAN: Gigi Kennedy.  Ozzie Castillo MD   PATIENT ACCOUNT#:   [id number]    LOCATION:  [facility]  MEDICAL RECORD #:   [id number]       YOB: 1965  ADMISSION DATE:       07/07/2017    H prostate. ASSESSMENT:  Recent renal colic requiring hospitalization, patient did require cystoscopy and placement of right double-J stent.   On followup in the office, the patient actually passed the 6 mm ureteral calculi that he brought for chemical tere

## 2017-07-07 ENCOUNTER — TELEPHONE (OUTPATIENT)
Dept: FAMILY MEDICINE CLINIC | Facility: CLINIC | Age: 52
End: 2017-07-07

## 2017-07-07 ENCOUNTER — HOSPITAL ENCOUNTER (OUTPATIENT)
Facility: HOSPITAL | Age: 52
Setting detail: HOSPITAL OUTPATIENT SURGERY
Discharge: HOME | End: 2017-07-07
Attending: UROLOGY | Admitting: UROLOGY
Payer: COMMERCIAL

## 2017-07-07 ENCOUNTER — ANESTHESIA (OUTPATIENT)
Dept: SURGERY | Facility: HOSPITAL | Age: 52
End: 2017-07-07
Payer: COMMERCIAL

## 2017-07-07 ENCOUNTER — SURGERY (OUTPATIENT)
Age: 52
End: 2017-07-07

## 2017-07-07 ENCOUNTER — ANESTHESIA EVENT (OUTPATIENT)
Dept: SURGERY | Facility: HOSPITAL | Age: 52
End: 2017-07-07
Payer: COMMERCIAL

## 2017-07-07 VITALS
RESPIRATION RATE: 16 BRPM | OXYGEN SATURATION: 96 % | TEMPERATURE: 98 F | DIASTOLIC BLOOD PRESSURE: 76 MMHG | BODY MASS INDEX: 31.44 KG/M2 | HEIGHT: 73 IN | SYSTOLIC BLOOD PRESSURE: 120 MMHG | WEIGHT: 237.25 LBS | HEART RATE: 78 BPM

## 2017-07-07 DIAGNOSIS — N20.1 RIGHT URETERAL STONE: Primary | ICD-10-CM

## 2017-07-07 PROCEDURE — 0TP98DZ REMOVAL OF INTRALUMINAL DEVICE FROM URETER, VIA NATURAL OR ARTIFICIAL OPENING ENDOSCOPIC: ICD-10-PCS | Performed by: UROLOGY

## 2017-07-07 PROCEDURE — 99213 OFFICE O/P EST LOW 20 MIN: CPT | Performed by: UROLOGY

## 2017-07-07 RX ORDER — MORPHINE SULFATE 2 MG/ML
2 INJECTION, SOLUTION INTRAMUSCULAR; INTRAVENOUS EVERY 10 MIN PRN
Status: DISCONTINUED | OUTPATIENT
Start: 2017-07-07 | End: 2017-07-07

## 2017-07-07 RX ORDER — SODIUM CHLORIDE, SODIUM LACTATE, POTASSIUM CHLORIDE, CALCIUM CHLORIDE 600; 310; 30; 20 MG/100ML; MG/100ML; MG/100ML; MG/100ML
INJECTION, SOLUTION INTRAVENOUS CONTINUOUS
Status: DISCONTINUED | OUTPATIENT
Start: 2017-07-07 | End: 2017-07-07

## 2017-07-07 RX ORDER — ACETAMINOPHEN 325 MG/1
650 TABLET ORAL ONCE
Status: COMPLETED | OUTPATIENT
Start: 2017-07-07 | End: 2017-07-07

## 2017-07-07 RX ORDER — METOCLOPRAMIDE 10 MG/1
10 TABLET ORAL ONCE
Status: COMPLETED | OUTPATIENT
Start: 2017-07-07 | End: 2017-07-07

## 2017-07-07 RX ORDER — HYDROMORPHONE HYDROCHLORIDE 1 MG/ML
0.2 INJECTION, SOLUTION INTRAMUSCULAR; INTRAVENOUS; SUBCUTANEOUS EVERY 5 MIN PRN
Status: DISCONTINUED | OUTPATIENT
Start: 2017-07-07 | End: 2017-07-07

## 2017-07-07 RX ORDER — ONDANSETRON 2 MG/ML
4 INJECTION INTRAMUSCULAR; INTRAVENOUS ONCE AS NEEDED
Status: DISCONTINUED | OUTPATIENT
Start: 2017-07-07 | End: 2017-07-07

## 2017-07-07 RX ORDER — HYDROMORPHONE HYDROCHLORIDE 1 MG/ML
0.4 INJECTION, SOLUTION INTRAMUSCULAR; INTRAVENOUS; SUBCUTANEOUS EVERY 5 MIN PRN
Status: DISCONTINUED | OUTPATIENT
Start: 2017-07-07 | End: 2017-07-07

## 2017-07-07 RX ORDER — HYDROCODONE BITARTRATE AND ACETAMINOPHEN 5; 325 MG/1; MG/1
2 TABLET ORAL AS NEEDED
Status: DISCONTINUED | OUTPATIENT
Start: 2017-07-07 | End: 2017-07-07

## 2017-07-07 RX ORDER — CIPROFLOXACIN 500 MG/1
500 TABLET, FILM COATED ORAL 2 TIMES DAILY
Qty: 10 TABLET | Refills: 0 | Status: SHIPPED | OUTPATIENT
Start: 2017-07-07 | End: 2017-07-12

## 2017-07-07 RX ORDER — FAMOTIDINE 20 MG/1
20 TABLET ORAL ONCE
Status: COMPLETED | OUTPATIENT
Start: 2017-07-07 | End: 2017-07-07

## 2017-07-07 RX ORDER — HYDROCODONE BITARTRATE AND ACETAMINOPHEN 5; 325 MG/1; MG/1
1 TABLET ORAL AS NEEDED
Status: DISCONTINUED | OUTPATIENT
Start: 2017-07-07 | End: 2017-07-07

## 2017-07-07 RX ORDER — MIDAZOLAM HYDROCHLORIDE 1 MG/ML
INJECTION INTRAMUSCULAR; INTRAVENOUS AS NEEDED
Status: DISCONTINUED | OUTPATIENT
Start: 2017-07-07 | End: 2017-07-07 | Stop reason: SURG

## 2017-07-07 RX ORDER — LIDOCAINE HYDROCHLORIDE 20 MG/ML
JELLY TOPICAL AS NEEDED
Status: DISCONTINUED | OUTPATIENT
Start: 2017-07-07 | End: 2017-07-07 | Stop reason: HOSPADM

## 2017-07-07 RX ORDER — NALOXONE HYDROCHLORIDE 0.4 MG/ML
80 INJECTION, SOLUTION INTRAMUSCULAR; INTRAVENOUS; SUBCUTANEOUS AS NEEDED
Status: DISCONTINUED | OUTPATIENT
Start: 2017-07-07 | End: 2017-07-07

## 2017-07-07 RX ORDER — MORPHINE SULFATE 4 MG/ML
4 INJECTION, SOLUTION INTRAMUSCULAR; INTRAVENOUS EVERY 10 MIN PRN
Status: DISCONTINUED | OUTPATIENT
Start: 2017-07-07 | End: 2017-07-07

## 2017-07-07 RX ORDER — LEVOFLOXACIN 5 MG/ML
500 INJECTION, SOLUTION INTRAVENOUS ONCE
Status: COMPLETED | OUTPATIENT
Start: 2017-07-07 | End: 2017-07-07

## 2017-07-07 RX ORDER — HYDROMORPHONE HYDROCHLORIDE 1 MG/ML
0.6 INJECTION, SOLUTION INTRAMUSCULAR; INTRAVENOUS; SUBCUTANEOUS EVERY 5 MIN PRN
Status: DISCONTINUED | OUTPATIENT
Start: 2017-07-07 | End: 2017-07-07

## 2017-07-07 RX ORDER — MORPHINE SULFATE 4 MG/ML
6 INJECTION, SOLUTION INTRAMUSCULAR; INTRAVENOUS EVERY 10 MIN PRN
Status: DISCONTINUED | OUTPATIENT
Start: 2017-07-07 | End: 2017-07-07

## 2017-07-07 RX ADMIN — SODIUM CHLORIDE, SODIUM LACTATE, POTASSIUM CHLORIDE, CALCIUM CHLORIDE: 600; 310; 30; 20 INJECTION, SOLUTION INTRAVENOUS at 09:50:00

## 2017-07-07 RX ADMIN — MIDAZOLAM HYDROCHLORIDE 2 MG: 1 INJECTION INTRAMUSCULAR; INTRAVENOUS at 09:29:00

## 2017-07-07 RX ADMIN — LEVOFLOXACIN 500 MG: 5 INJECTION, SOLUTION INTRAVENOUS at 09:31:00

## 2017-07-07 NOTE — TELEPHONE ENCOUNTER
Called patient. No answer. Left message on answering machine at preferred number of normal blood test results. Result letter mailed home today.

## 2017-07-07 NOTE — ANESTHESIA POSTPROCEDURE EVALUATION
Patient: Javier Gallardo    Procedure Summary     Date:  07/07/17 Room / Location:  [address] [place] [address] MAIN OR 14 / [address] [place] [address] MAIN OR    Anesthesia Start:  2876 Anesthesia Stop:  0849    Procedure:  CYSTOSCOPY (Right Bladder) Diagnosis:  (right ureteral stone)    Surgeon:  Le White

## 2017-07-07 NOTE — ANESTHESIA PREPROCEDURE EVALUATION
Anesthesia PreOp Note    HPI:     Veronica Herrera is a 46year old male who presents for preoperative consultation requested by: Nic Gallegos MD    Date of Surgery: 7/7/2017    Procedure(s):  CYSTOSCOPY  Indication: right ureteral stone    * No Diagnosis fentaNYL citrate (SUBLIMAZE) 0.05 MG/ML injection 25 mcg 25 mcg Intravenous Q5 Min PRN Cintia De Oliveira MD    fentaNYL citrate (SUBLIMAZE) 0.05 MG/ML injection 50 mcg 50 mcg Intravenous Q5 Min PRN Cintia De Oliveira MD    HYDROmorphone HCl PF (DILAUDID) 1 Component Value Date   WBC 9.3 06/04/2017   RBC 4.58 06/04/2017   HGB 14.2 06/04/2017   HCT 41.2 06/04/2017   MCV 90.0 06/04/2017   MCH 30.9 06/04/2017   MCHC 34.4 06/04/2017   RDW 12.7 06/04/2017    (L) 06/04/2017   MPV 9.3 06/04/2017       Lab Res

## 2017-07-07 NOTE — OPERATIVE REPORT
[facility] OPERATING ROOM  Operative Note     Pablo Jeans Location: OR   CSN [id number] MRN [id number]   Admission Date 7/7/2017 Operation Date 7/7/2017   Attending Physician Kyra Pelayo MD Operating Physician Rudy Luciano.  Mark Danielson MD      Preoperati bypassed upon entering the bladder urine was drained exchanging the 70° lens inspection of bladder showed normal mucosa ureteral orifices and trigone with the exception of right double-J stent exiting right UVJ which was grasped with grasping forceps and r

## 2017-07-07 NOTE — INTERVAL H&P NOTE
Pre-op Diagnosis: right ureteral stone    The above referenced H&P was reviewed by Silvio Cleaning. Tiffany Winslow MD on 7/7/2017, the patient was examined and no significant changes have occurred in the patient's condition since the H&P was performed.   I discussed with shannan

## 2017-07-10 ENCOUNTER — TELEPHONE (OUTPATIENT)
Dept: FAMILY MEDICINE CLINIC | Facility: CLINIC | Age: 52
End: 2017-07-10

## 2017-07-10 DIAGNOSIS — N19 RENAL FAILURE, UNSPECIFIED CHRONICITY: Primary | ICD-10-CM

## 2017-07-10 NOTE — TELEPHONE ENCOUNTER
----- Message from Syl Velasquez DO sent at 6/26/2017  8:18 AM CDT -----  Repeat blood sugar was better. Kidney function much better. No longer in danger. Repeat cmp in 3 mo dx acute renal failure. Continue on efforts to stay hydrated this summer.

## 2017-08-11 ENCOUNTER — APPOINTMENT (OUTPATIENT)
Dept: GENERAL RADIOLOGY | Age: 52
End: 2017-08-11
Attending: NURSE PRACTITIONER
Payer: COMMERCIAL

## 2017-08-11 ENCOUNTER — HOSPITAL ENCOUNTER (OUTPATIENT)
Age: 52
Discharge: HOME | End: 2017-08-11
Payer: COMMERCIAL

## 2017-08-11 VITALS
WEIGHT: 233 LBS | BODY MASS INDEX: 30.88 KG/M2 | SYSTOLIC BLOOD PRESSURE: 136 MMHG | DIASTOLIC BLOOD PRESSURE: 75 MMHG | HEIGHT: 73 IN | HEART RATE: 87 BPM | RESPIRATION RATE: 16 BRPM | TEMPERATURE: 99 F | OXYGEN SATURATION: 100 %

## 2017-08-11 DIAGNOSIS — S93.105A: Primary | ICD-10-CM

## 2017-08-11 DIAGNOSIS — T14.8XXA AVULSION FRACTURE: ICD-10-CM

## 2017-08-11 PROCEDURE — 99213 OFFICE O/P EST LOW 20 MIN: CPT

## 2017-08-11 PROCEDURE — 73660 X-RAY EXAM OF TOE(S): CPT | Performed by: NURSE PRACTITIONER

## 2017-08-11 PROCEDURE — 99214 OFFICE O/P EST MOD 30 MIN: CPT

## 2017-08-11 PROCEDURE — 73630 X-RAY EXAM OF FOOT: CPT | Performed by: NURSE PRACTITIONER

## 2017-08-11 PROCEDURE — 28660 TREAT TOE DISLOCATION: CPT

## 2017-08-11 RX ORDER — TRAMADOL HYDROCHLORIDE 50 MG/1
50 TABLET ORAL ONCE
Status: COMPLETED | OUTPATIENT
Start: 2017-08-11 | End: 2017-08-11

## 2017-08-11 NOTE — ED PROVIDER NOTES
Patient presents with:  Musculoskeletal Problem      HPI:     Veronica Herrera is a 46year old male who presents today with a chief complaint of pain in the upper the foot at the base of the toes that noted after an injury that occurred 1 week ago.   The darcy Yes  Normal capillary refill: Yes  ROM:  pain to the top of the left foot at the base of the toes with ROM and ambulation. No deformity. No ankle pain. No Achilles pain. No calf pain. Difficulty moving the left second toe.    Point Tenderness: Yes    Physic (ehg=[id number])    Result Date: 8/11/2017  CONCLUSION:  1. Persistent lateral dislocation of the left second proximal phalanx relative to the second metatarsal at the metatarsophalangeal joint.  2. Associated avulsion fracture along the lateral margin of the he

## 2017-08-17 ENCOUNTER — OFFICE VISIT (OUTPATIENT)
Dept: FAMILY MEDICINE CLINIC | Facility: CLINIC | Age: 52
End: 2017-08-17

## 2017-08-17 VITALS
HEART RATE: 97 BPM | DIASTOLIC BLOOD PRESSURE: 84 MMHG | WEIGHT: 237 LBS | SYSTOLIC BLOOD PRESSURE: 144 MMHG | BODY MASS INDEX: 31.41 KG/M2 | HEIGHT: 73 IN

## 2017-08-17 DIAGNOSIS — N17.9 AKI (ACUTE KIDNEY INJURY) (HCC): ICD-10-CM

## 2017-08-17 DIAGNOSIS — Z00.00 ANNUAL PHYSICAL EXAM: Primary | ICD-10-CM

## 2017-08-17 DIAGNOSIS — S93.104D: ICD-10-CM

## 2017-08-17 DIAGNOSIS — N20.0 CALCIUM OXALATE STONES: ICD-10-CM

## 2017-08-17 DIAGNOSIS — Z12.11 SCREEN FOR COLON CANCER: ICD-10-CM

## 2017-08-17 DIAGNOSIS — N20.9 URIC ACID STONE IN URINE: ICD-10-CM

## 2017-08-17 DIAGNOSIS — N17.9 ACUTE RENAL FAILURE, UNSPECIFIED ACUTE RENAL FAILURE TYPE (HCC): ICD-10-CM

## 2017-08-17 PROCEDURE — 99396 PREV VISIT EST AGE 40-64: CPT | Performed by: FAMILY MEDICINE

## 2017-08-17 NOTE — PROGRESS NOTES
Physical    Patient's past medical surgical family social history was reviewed. Review of Systems  Allergic: no environmental allergies or food allergies  Cardiovascular: no chest pain, irregular heartbeat, or palpitations.   Constitutional: no fever or normal.    Assessment/Plan  1. Annual physical exam  Discussed alcohol in less and is drinking significantly help with uric acid stones    2. Acute renal failure, unspecified acute renal failure type ([facility] [address]  Continue with lots of hydration recheck CMP    3.

## 2017-09-02 ENCOUNTER — APPOINTMENT (OUTPATIENT)
Dept: LAB | Age: 52
End: 2017-09-02
Attending: FAMILY MEDICINE
Payer: COMMERCIAL

## 2017-09-02 DIAGNOSIS — E78.5 HYPERLIPIDEMIA, UNSPECIFIED HYPERLIPIDEMIA TYPE: ICD-10-CM

## 2017-09-02 DIAGNOSIS — N17.9 ACUTE RENAL FAILURE, UNSPECIFIED ACUTE RENAL FAILURE TYPE (HCC): ICD-10-CM

## 2017-09-02 DIAGNOSIS — Z12.5 SCREENING FOR PROSTATE CANCER: ICD-10-CM

## 2017-09-02 LAB
ALBUMIN SERPL BCP-MCNC: 3.9 G/DL (ref 3.5–4.8)
ALBUMIN/GLOB SERPL: 1.3 {RATIO} (ref 1–2)
ALP SERPL-CCNC: 76 U/L (ref 32–100)
ALT SERPL-CCNC: 128 U/L (ref 17–63)
ANION GAP SERPL CALC-SCNC: 10 MMOL/L (ref 0–18)
AST SERPL-CCNC: 68 U/L (ref 15–41)
BILIRUB SERPL-MCNC: 1 MG/DL (ref 0.3–1.2)
BUN SERPL-MCNC: 14 MG/DL (ref 8–20)
BUN/CREAT SERPL: 12.6 (ref 10–20)
CALCIUM SERPL-MCNC: 9.1 MG/DL (ref 8.5–10.5)
CHLORIDE SERPL-SCNC: 104 MMOL/L (ref 95–110)
CHOLEST SERPL-MCNC: 190 MG/DL (ref 110–200)
CO2 SERPL-SCNC: 24 MMOL/L (ref 22–32)
CREAT SERPL-MCNC: 1.11 MG/DL (ref 0.5–1.5)
GLOBULIN PLAS-MCNC: 3.1 G/DL (ref 2.5–3.7)
GLUCOSE SERPL-MCNC: 127 MG/DL (ref 70–99)
HDLC SERPL-MCNC: 30 MG/DL
LDLC SERPL CALC-MCNC: 123 MG/DL (ref 0–99)
NONHDLC SERPL-MCNC: 160 MG/DL
OSMOLALITY UR CALC.SUM OF ELEC: 288 MOSM/KG (ref 275–295)
POTASSIUM SERPL-SCNC: 4.1 MMOL/L (ref 3.3–5.1)
PROT SERPL-MCNC: 7 G/DL (ref 5.9–8.4)
PSA SERPL-MCNC: 1 NG/ML (ref 0–4)
SODIUM SERPL-SCNC: 138 MMOL/L (ref 136–144)
TRIGL SERPL-MCNC: 184 MG/DL (ref 1–149)

## 2017-09-02 PROCEDURE — 36415 COLL VENOUS BLD VENIPUNCTURE: CPT

## 2017-09-02 PROCEDURE — 80053 COMPREHEN METABOLIC PANEL: CPT

## 2017-09-02 PROCEDURE — 80061 LIPID PANEL: CPT

## 2017-09-06 ENCOUNTER — TELEPHONE (OUTPATIENT)
Dept: OTHER | Age: 52
End: 2017-09-06

## 2017-09-06 DIAGNOSIS — R73.03 PREDIABETES: Primary | ICD-10-CM

## 2017-09-06 DIAGNOSIS — R79.89 ELEVATED LIVER FUNCTION TESTS: ICD-10-CM

## 2017-09-06 NOTE — TELEPHONE ENCOUNTER
Notes Recorded by Ciara Cox DO on 9/2/2017 at 1:17 PM CDT  Kidney function tests was much better than previous.  The kidney function was normal.  Liver function tests are elevated and so is the blood sugar.  The bad cholesterol is elevated as wel

## 2017-09-12 NOTE — TELEPHONE ENCOUNTER
Spoke with patient (verified name and ), reviewed information, patient verbalized understanding. Pt was provided with contact information for central scheduling and instructed to call back for an appt 2 weeks after the blood work and US are complete.

## 2017-09-16 ENCOUNTER — APPOINTMENT (OUTPATIENT)
Dept: LAB | Age: 52
End: 2017-09-16
Attending: FAMILY MEDICINE
Payer: COMMERCIAL

## 2017-09-16 DIAGNOSIS — R73.03 PREDIABETES: ICD-10-CM

## 2017-09-16 DIAGNOSIS — R79.89 ELEVATED LIVER FUNCTION TESTS: ICD-10-CM

## 2017-09-16 LAB
FERRITIN SERPL IA-MCNC: 1453 NG/ML (ref 24–336)
GLUCOSE SERPL-MCNC: 130 MG/DL (ref 70–99)
HBA1C MFR BLD: 5.4 % (ref 4–6)

## 2017-09-16 PROCEDURE — 86645 CMV ANTIBODY IGM: CPT

## 2017-09-16 PROCEDURE — 82947 ASSAY GLUCOSE BLOOD QUANT: CPT

## 2017-09-16 PROCEDURE — 86644 CMV ANTIBODY: CPT

## 2017-09-16 PROCEDURE — 36415 COLL VENOUS BLD VENIPUNCTURE: CPT

## 2017-09-16 PROCEDURE — 83036 HEMOGLOBIN GLYCOSYLATED A1C: CPT

## 2017-09-16 PROCEDURE — 82728 ASSAY OF FERRITIN: CPT

## 2017-09-16 PROCEDURE — 80074 ACUTE HEPATITIS PANEL: CPT

## 2017-09-18 LAB
CMV IGG SERPL QL: POSITIVE
CMV IGM SERPL QL: NEGATIVE
HAV IGM SERPL QL IA: NONREACTIVE
HBV CORE IGM SERPL QL IA: NONREACTIVE
HBV SURFACE AG SERPL QL IA: NONREACTIVE
HCV AB SERPL QL IA: NONREACTIVE

## 2017-09-19 ENCOUNTER — APPOINTMENT (OUTPATIENT)
Dept: LAB | Age: 52
End: 2017-09-19
Attending: FAMILY MEDICINE
Payer: COMMERCIAL

## 2017-09-19 ENCOUNTER — TELEPHONE (OUTPATIENT)
Dept: FAMILY MEDICINE CLINIC | Facility: CLINIC | Age: 52
End: 2017-09-19

## 2017-09-19 DIAGNOSIS — R79.89 ELEVATED FERRITIN LEVEL: Primary | ICD-10-CM

## 2017-09-19 DIAGNOSIS — N19 RENAL FAILURE, UNSPECIFIED CHRONICITY: ICD-10-CM

## 2017-09-19 DIAGNOSIS — R79.89 ELEVATED FERRITIN LEVEL: ICD-10-CM

## 2017-09-19 LAB
ALBUMIN SERPL BCP-MCNC: 3.8 G/DL (ref 3.5–4.8)
ALBUMIN/GLOB SERPL: 1.2 {RATIO} (ref 1–2)
ALP SERPL-CCNC: 72 U/L (ref 32–100)
ALT SERPL-CCNC: 117 U/L (ref 17–63)
ANION GAP SERPL CALC-SCNC: 8 MMOL/L (ref 0–18)
AST SERPL-CCNC: 83 U/L (ref 15–41)
BILIRUB SERPL-MCNC: 0.7 MG/DL (ref 0.3–1.2)
BUN SERPL-MCNC: 17 MG/DL (ref 8–20)
BUN/CREAT SERPL: 16.7 (ref 10–20)
CALCIUM SERPL-MCNC: 8.5 MG/DL (ref 8.5–10.5)
CHLORIDE SERPL-SCNC: 106 MMOL/L (ref 95–110)
CO2 SERPL-SCNC: 22 MMOL/L (ref 22–32)
CREAT SERPL-MCNC: 1.02 MG/DL (ref 0.5–1.5)
CRP SERPL-MCNC: 0.9 MG/DL (ref 0–0.9)
ERYTHROCYTE [SEDIMENTATION RATE] IN BLOOD: 9 MM/HR (ref 0–20)
GLOBULIN PLAS-MCNC: 3.3 G/DL (ref 2.5–3.7)
GLUCOSE SERPL-MCNC: 105 MG/DL (ref 70–99)
IRON SATN MFR SERPL: 31 % (ref 20–55)
IRON SERPL-MCNC: 97 MCG/DL (ref 45–182)
OSMOLALITY UR CALC.SUM OF ELEC: 284 MOSM/KG (ref 275–295)
POTASSIUM SERPL-SCNC: 3.8 MMOL/L (ref 3.3–5.1)
PROT SERPL-MCNC: 7.1 G/DL (ref 5.9–8.4)
SODIUM SERPL-SCNC: 136 MMOL/L (ref 136–144)
TIBC SERPL-MCNC: 315 MCG/DL (ref 228–428)
TRANSFERRIN SERPL-MCNC: 239 MG/DL (ref 180–329)
TSH SERPL-ACNC: 1.46 UIU/ML (ref 0.45–5.33)

## 2017-09-19 PROCEDURE — 83540 ASSAY OF IRON: CPT

## 2017-09-19 PROCEDURE — 80053 COMPREHEN METABOLIC PANEL: CPT

## 2017-09-19 PROCEDURE — 81256 HFE GENE: CPT

## 2017-09-19 PROCEDURE — 84443 ASSAY THYROID STIM HORMONE: CPT

## 2017-09-19 PROCEDURE — 85652 RBC SED RATE AUTOMATED: CPT

## 2017-09-19 PROCEDURE — 86140 C-REACTIVE PROTEIN: CPT

## 2017-09-19 PROCEDURE — 36415 COLL VENOUS BLD VENIPUNCTURE: CPT

## 2017-09-19 PROCEDURE — 84466 ASSAY OF TRANSFERRIN: CPT

## 2017-09-19 NOTE — TELEPHONE ENCOUNTER
Pt confirms that he is NOT taking any supplements of any kind. Advised to proceed with additional lab tests. Pt has US scheduled for this Saturday. (i confirmed that pt needed to have blood redrawn. The previous samples will not be sufficient).

## 2017-09-19 NOTE — TELEPHONE ENCOUNTER
Please confirm the patient is not taking any iron supplements. Patient's level of iron was very high. Sometimes this causes trouble with the liver. We will need to investigate this issue.   Please order the following tests see if they can add it onto [facility]

## 2017-09-22 LAB
C282Y HEMOCHROMATOSIS MUTATION: NEGATIVE
H63D HEMOCHROMATOSIS MUTATION: NEGATIVE
S65C HEMOCHROMATOSIS MUTATION: NEGATIVE

## 2017-09-23 ENCOUNTER — HOSPITAL ENCOUNTER (OUTPATIENT)
Dept: ULTRASOUND IMAGING | Age: 52
Discharge: HOME | End: 2017-09-23
Attending: FAMILY MEDICINE
Payer: COMMERCIAL

## 2017-09-23 ENCOUNTER — TELEPHONE (OUTPATIENT)
Dept: OTHER | Age: 52
End: 2017-09-23

## 2017-09-23 DIAGNOSIS — R79.89 ELEVATED LIVER FUNCTION TESTS: ICD-10-CM

## 2017-09-23 PROCEDURE — 76700 US EXAM ABDOM COMPLETE: CPT | Performed by: FAMILY MEDICINE

## 2017-09-23 NOTE — TELEPHONE ENCOUNTER
----- Message from Robert Albright DO sent at 9/23/2017  7:44 AM CDT -----  Neg for hemochromatosis  ( a type of liver disease)  F/u in the office.

## 2017-09-25 NOTE — TELEPHONE ENCOUNTER
After going through his labs 2 other were not addressed.    LMTCB      Blood sugar much improved.  Thyroid function test was normal.  Liver function tests were elevated.  We are still waiting on a blood test for his liver(for hereditary hemochromatosis  rar

## 2017-09-25 NOTE — TELEPHONE ENCOUNTER
Spoke with patient (identified name and ), results reviewed and agrees with plan.       Result Notes     Notes Recorded by Abhinav Mancilla DO on 2017 at 9:49 AM CDT  Ultrasound of the liver showed enlargement of the liver with fat deposition.  T

## 2017-09-25 NOTE — TELEPHONE ENCOUNTER
spoke to pt, he was already provided with the results for the US and Negative hemochromatosis but received message that more results were available. Results were provided after verifying name and .  Pt has a previously scheduled appt and has no questions

## 2017-09-25 NOTE — TELEPHONE ENCOUNTER
Notes Recorded by Dana Thomas DO on 9/21/2017 at 8:10 AM CDT  Blood sugar much improved.  Thyroid function test was normal.  Liver function tests were elevated.  We are still waiting on a blood test for his liver(for hereditary hemochromatosis  rar

## 2017-09-26 NOTE — TELEPHONE ENCOUNTER
Spoke with patient (identified name and ) ,results reviewed and agrees with  Plan. Patient verbalized understanding. Already aware of the US results. Scheduled appointment on 10/10/17 with Dr Shazia Leung.         Blood sugar much improved.  Thyroid function t

## 2017-10-10 ENCOUNTER — OFFICE VISIT (OUTPATIENT)
Dept: FAMILY MEDICINE CLINIC | Facility: CLINIC | Age: 52
End: 2017-10-10

## 2017-10-10 VITALS
HEART RATE: 69 BPM | WEIGHT: 239 LBS | DIASTOLIC BLOOD PRESSURE: 93 MMHG | SYSTOLIC BLOOD PRESSURE: 133 MMHG | BODY MASS INDEX: 32 KG/M2

## 2017-10-10 DIAGNOSIS — R79.89 ELEVATED FERRITIN LEVEL: Primary | ICD-10-CM

## 2017-10-10 DIAGNOSIS — N17.9 ACUTE RENAL FAILURE, UNSPECIFIED ACUTE RENAL FAILURE TYPE (HCC): ICD-10-CM

## 2017-10-10 DIAGNOSIS — R79.89 ELEVATED LFTS: ICD-10-CM

## 2017-10-10 PROCEDURE — 99212 OFFICE O/P EST SF 10 MIN: CPT | Performed by: FAMILY MEDICINE

## 2017-10-10 PROCEDURE — 99213 OFFICE O/P EST LOW 20 MIN: CPT | Performed by: FAMILY MEDICINE

## 2017-10-10 NOTE — PROGRESS NOTES
gurpreet natalie   Had his of pos cmv IgG    Has odd sleep schedules    History of fatty liver.     A/p

## 2017-12-07 ENCOUNTER — OFFICE VISIT (OUTPATIENT)
Dept: GASTROENTEROLOGY | Facility: CLINIC | Age: 52
End: 2017-12-07

## 2017-12-07 ENCOUNTER — APPOINTMENT (OUTPATIENT)
Dept: LAB | Age: 52
End: 2017-12-07
Attending: INTERNAL MEDICINE
Payer: COMMERCIAL

## 2017-12-07 VITALS
DIASTOLIC BLOOD PRESSURE: 93 MMHG | SYSTOLIC BLOOD PRESSURE: 144 MMHG | BODY MASS INDEX: 32.5 KG/M2 | HEART RATE: 76 BPM | WEIGHT: 245.19 LBS | HEIGHT: 73 IN

## 2017-12-07 DIAGNOSIS — R74.8 ABNORMAL LIVER ENZYMES: ICD-10-CM

## 2017-12-07 DIAGNOSIS — R74.8 ABNORMAL LIVER ENZYMES: Primary | ICD-10-CM

## 2017-12-07 PROCEDURE — 86708 HEPATITIS A ANTIBODY: CPT

## 2017-12-07 PROCEDURE — 87340 HEPATITIS B SURFACE AG IA: CPT

## 2017-12-07 PROCEDURE — 86803 HEPATITIS C AB TEST: CPT

## 2017-12-07 PROCEDURE — 85610 PROTHROMBIN TIME: CPT

## 2017-12-07 PROCEDURE — 99212 OFFICE O/P EST SF 10 MIN: CPT | Performed by: INTERNAL MEDICINE

## 2017-12-07 PROCEDURE — 36415 COLL VENOUS BLD VENIPUNCTURE: CPT

## 2017-12-07 PROCEDURE — 86376 MICROSOMAL ANTIBODY EACH: CPT

## 2017-12-07 PROCEDURE — 80500 HEPATITIS A B + C PROFILE: CPT

## 2017-12-07 PROCEDURE — 86038 ANTINUCLEAR ANTIBODIES: CPT

## 2017-12-07 PROCEDURE — 86255 FLUORESCENT ANTIBODY SCREEN: CPT

## 2017-12-07 PROCEDURE — 86704 HEP B CORE ANTIBODY TOTAL: CPT

## 2017-12-07 PROCEDURE — 80076 HEPATIC FUNCTION PANEL: CPT

## 2017-12-07 PROCEDURE — 99243 OFF/OP CNSLTJ NEW/EST LOW 30: CPT | Performed by: INTERNAL MEDICINE

## 2017-12-07 PROCEDURE — 82103 ALPHA-1-ANTITRYPSIN TOTAL: CPT

## 2017-12-07 PROCEDURE — 82105 ALPHA-FETOPROTEIN SERUM: CPT

## 2017-12-07 PROCEDURE — 82390 ASSAY OF CERULOPLASMIN: CPT

## 2017-12-07 PROCEDURE — 86706 HEP B SURFACE ANTIBODY: CPT

## 2017-12-07 PROCEDURE — 82728 ASSAY OF FERRITIN: CPT

## 2017-12-07 RX ORDER — LORATADINE 10 MG/1
10 TABLET ORAL DAILY
COMMUNITY
End: 2019-07-06

## 2017-12-07 NOTE — PATIENT INSTRUCTIONS
Fatty liver  - minimize alcohol  - weight loss- call Dr. Jose Perry [phone] for options  - exercise  - lab work now and in 3 months  - see me back in the office in 3 months

## 2017-12-12 ENCOUNTER — TELEPHONE (OUTPATIENT)
Dept: GASTROENTEROLOGY | Facility: CLINIC | Age: 52
End: 2017-12-12

## 2017-12-12 DIAGNOSIS — K76.0 FATTY LIVER: Primary | ICD-10-CM

## 2017-12-13 NOTE — TELEPHONE ENCOUNTER
Lab work discussed, liver tests improved. Ferritin down but still elevated    Continue to work on diet and exercise. See Dr Nikita Alcocer    See me in 3 months, repeat liver panel and ferritin 1 week before see me in the office.

## 2017-12-14 NOTE — PROGRESS NOTES
Guerita Paige is a 46year old male. HPI:   Patient presents with:  Elevated Liver Chemistry      The patient is a 17-year-old male who has a history of kidney stones who presents with abnormal liver function tests and elevated ferritin.   His ferritin l clear with no masses or lesions  Chest- Clear bilaterally, no wheezing,  Heart- regular rate, no murmur or gallop  Abdomen- Soft and nontender, nondistended, no scars, no organosplenomegly  Ext- no clubbing or cyanosis  Skin- no rashes or lesions  Neuro- a

## 2018-02-23 NOTE — PROGRESS NOTES
Clinic Consult:  [facility] Consultation Note    Reason for Consult:  The primary encounter diagnosis was Lower abdominal pain. Diagnoses of Nausea, Constipation, unspecified constipation type, Early satiety, Decreased appetite, and Bloating were also pertinent to this visit.    PCP: Jamal Gunn       HPI:  This is a 20 y.o. female here for evaluation of the above  Pt is here with her father  She states that since October, she has had continued issues with nausea, vomiting, lower abdominal pain with excess bloating.  She has also had some early satiety and decreased appetitive resulting in some weight loss.   She denies any known exacerbating or reliving factors.    She has had some intermittent loose stools.  Her typical bowel pattern is one of mild constipation with having a BM every 2-3 days with straining.   She denies any melena or hematochezia.   No previous issues with these symptoms.   No fam hx of CRC or IBD.  No previous endoscopy    Review of Systems   Constitutional: Negative for chills, fever, malaise/fatigue and weight loss.   Respiratory: Negative for cough.    Cardiovascular: Negative for chest pain.   Gastrointestinal:        Per HPI   Musculoskeletal: Negative for myalgias.   Skin: Negative for itching and rash.   Neurological: Negative for headaches.   Psychiatric/Behavioral: The patient is not nervous/anxious.        Medical History:   History reviewed. No pertinent past medical history.    Surgical History:  History reviewed. No pertinent surgical history.    Family History:   History reviewed. No pertinent family history.    Social History:   Social History   Substance Use Topics    Smoking status: Never Smoker    Smokeless tobacco: Never Used    Alcohol use No       Allergies: Reviewed    Home Medications:   Current Outpatient Prescriptions on File Prior to Visit   Medication Sig Dispense Refill    norelgestromin-ethinyl estradiol (ORTHO EVRA) 150-35 mcg/24 hr Place 1  [facility]    Progress Note      Subjective:     Feels better .  S/p right ureteral stent on 5/3 and urinating better since yeterday    Review of Systems:     Constitutional: negative for fatigue, fevers and weight loss  Eyes: negative for "patch onto the skin once a week. Place for 3 weeks each month.  Leave patch off for one week each month to allow period. 4 patch 11    ondansetron (ZOFRAN) 4 MG tablet Take 1 tablet (4 mg total) by mouth every 6 (six) hours. 12 tablet 0    promethazine (PHENERGAN) 12.5 MG Tab Take 1 tablet (12.5 mg total) by mouth every 6 to 8 hours as needed (nausea). 10 tablet 0     No current facility-administered medications on file prior to visit.        Physical Exam:  Vital Signs:  /78   Pulse (!) 114   Ht 5' 2" (1.575 m)   Wt 42.6 kg (93 lb 14.7 oz)   BMI 17.18 kg/m²   Body mass index is 17.18 kg/m².  Physical Exam   Constitutional: She is oriented to person, place, and time. She appears well-developed and well-nourished.   thin   HENT:   Head: Normocephalic.   Eyes: No scleral icterus.   Neck: Normal range of motion.   Cardiovascular: Normal rate and regular rhythm.    Pulmonary/Chest: Effort normal and breath sounds normal.   Abdominal: Soft. Bowel sounds are normal. She exhibits no distension. There is no tenderness.   Musculoskeletal: Normal range of motion.   Neurological: She is alert and oriented to person, place, and time.   Skin: Skin is warm and dry.   Psychiatric: She has a normal mood and affect.   Vitals reviewed.      Labs: Pertinent labs reviewed.      Assessment:  1. Lower abdominal pain    2. Nausea    3. Constipation, unspecified constipation type    4. Early satiety    5. Decreased appetite    6. Bloating         Recommendations:  - Unclear etiology  - ? Constipation that has worsened and is uncontrolled  - ? GERD vs PUD vs other etiologies  - Will get x-ray today to determine stool burden.  If positive, will start on daily Miralax  - If negative, will plan for EGD  Lower abdominal pain  -     X-Ray Abdomen Flat And Erect; Future; Expected date: 02/23/2018    Nausea  -     X-Ray Abdomen Flat And Erect; Future; Expected date: 02/23/2018    Constipation, unspecified constipation type    Early " Intravenous Q4H PRN   morphINE sulfate (PF) 2 MG/ML injection 2 mg 2 mg Intravenous Q4H PRN   lactated ringers infusion  Intravenous Continuous   [START ON 6/5/2017] levofloxacin in D5W (LEVAQUIN) IVPB premix 500 mg 500 mg Intravenous Q48H   HYDROcodone-ac 06/02/2017 26.9 23.2-35.3 seconds Final   ----------  INR   Date Value Ref Range Status   06/02/2017 1.1 0.9-1.2 Final   Comment:     Only the INR (not the Protime value) should be utilized for   the monitoring of oral anticoagulant therapy.      Recommen satiety    Decreased appetite    Bloating          Follow up to be determined by results of above.        Thank you so much for allowing me to participate in the care of PEDRO Morales   s/s of UTI    2.  Recurrent nephrolithiasis  - state has 20 stones at home but never analyzed    - educated about the risks of recurrent stones    - outpt 24hrs urorisk profile  - low salt diet, 3 liters fluid intake and low protein diet - educated     DW ARNULFO

## 2018-03-08 ENCOUNTER — OFFICE VISIT (OUTPATIENT)
Dept: GASTROENTEROLOGY | Facility: CLINIC | Age: 53
End: 2018-03-08

## 2018-03-08 ENCOUNTER — APPOINTMENT (OUTPATIENT)
Dept: LAB | Age: 53
End: 2018-03-08
Attending: INTERNAL MEDICINE
Payer: COMMERCIAL

## 2018-03-08 VITALS
WEIGHT: 240.63 LBS | BODY MASS INDEX: 31.89 KG/M2 | SYSTOLIC BLOOD PRESSURE: 139 MMHG | HEIGHT: 73 IN | HEART RATE: 80 BPM | DIASTOLIC BLOOD PRESSURE: 82 MMHG

## 2018-03-08 DIAGNOSIS — K76.0 FATTY LIVER: Primary | ICD-10-CM

## 2018-03-08 DIAGNOSIS — K76.0 FATTY LIVER: ICD-10-CM

## 2018-03-08 LAB
ALBUMIN SERPL BCP-MCNC: 4.1 G/DL (ref 3.5–4.8)
ALP SERPL-CCNC: 67 U/L (ref 32–100)
ALT SERPL-CCNC: 58 U/L (ref 17–63)
AST SERPL-CCNC: 37 U/L (ref 15–41)
BILIRUB DIRECT SERPL-MCNC: 0.1 MG/DL (ref 0–0.2)
BILIRUB SERPL-MCNC: 1.1 MG/DL (ref 0.3–1.2)
FERRITIN SERPL IA-MCNC: 926 NG/ML (ref 24–336)
PROT SERPL-MCNC: 8.2 G/DL (ref 5.9–8.4)

## 2018-03-08 PROCEDURE — 36415 COLL VENOUS BLD VENIPUNCTURE: CPT

## 2018-03-08 PROCEDURE — 99213 OFFICE O/P EST LOW 20 MIN: CPT | Performed by: INTERNAL MEDICINE

## 2018-03-08 PROCEDURE — 82728 ASSAY OF FERRITIN: CPT

## 2018-03-08 PROCEDURE — 80076 HEPATIC FUNCTION PANEL: CPT

## 2018-03-08 PROCEDURE — 99212 OFFICE O/P EST SF 10 MIN: CPT | Performed by: INTERNAL MEDICINE

## 2018-03-08 NOTE — PATIENT INSTRUCTIONS
Fatty liver  - weight loss and cut out alcohol  - call Dr. Diane Bowman [phone]  - lab work today and in 3 months    Follow up in 6 months

## 2018-03-15 NOTE — PROGRESS NOTES
Javier Gallardo is a 46year old male. HPI:   Patient presents with:  Abnormal Liver Enzymes    The patient is a 22-year-old male who follows up in the office today. He has been diagnosed with fatty liver.   He has been able to get his weight down approxi response, alert, no confusion  . ASSESSMENT/PLAN:   Assessment   Fatty liver    The patient is a 55-year-old male who follows up in the office today for fatty liver.   Discussed fatty liver disease at length including the risk of progression to more advanc

## 2018-12-04 ENCOUNTER — OFFICE VISIT (OUTPATIENT)
Dept: FAMILY MEDICINE CLINIC | Facility: CLINIC | Age: 53
End: 2018-12-04
Payer: COMMERCIAL

## 2018-12-04 VITALS
BODY MASS INDEX: 29.16 KG/M2 | HEART RATE: 73 BPM | HEIGHT: 73 IN | SYSTOLIC BLOOD PRESSURE: 144 MMHG | DIASTOLIC BLOOD PRESSURE: 85 MMHG | WEIGHT: 220 LBS | TEMPERATURE: 98 F

## 2018-12-04 DIAGNOSIS — F41.9 ANXIETY: ICD-10-CM

## 2018-12-04 DIAGNOSIS — I10 ESSENTIAL HYPERTENSION: ICD-10-CM

## 2018-12-04 DIAGNOSIS — N19 RENAL FAILURE, UNSPECIFIED CHRONICITY: ICD-10-CM

## 2018-12-04 DIAGNOSIS — Z00.00 ANNUAL PHYSICAL EXAM: Primary | ICD-10-CM

## 2018-12-04 PROCEDURE — 90471 IMMUNIZATION ADMIN: CPT | Performed by: FAMILY MEDICINE

## 2018-12-04 PROCEDURE — 90686 IIV4 VACC NO PRSV 0.5 ML IM: CPT | Performed by: FAMILY MEDICINE

## 2018-12-04 PROCEDURE — 99396 PREV VISIT EST AGE 40-64: CPT | Performed by: FAMILY MEDICINE

## 2018-12-04 RX ORDER — ESCITALOPRAM OXALATE 20 MG/1
20 TABLET ORAL DAILY
Qty: 30 TABLET | Refills: 1 | Status: SHIPPED | OUTPATIENT
Start: 2018-12-04 | End: 2019-03-09

## 2018-12-04 RX ORDER — LOSARTAN POTASSIUM 50 MG/1
50 TABLET ORAL DAILY
Qty: 90 TABLET | Refills: 1 | Status: SHIPPED | OUTPATIENT
Start: 2018-12-04 | End: 2019-06-01

## 2018-12-04 NOTE — PROGRESS NOTES
REASON FOR VISIT:    Steven Ramirez is a 48year old male who presents for an [address]. Child jailed and pt very nervous has been taking escitalopram 20mg and feeling better.       Patient Active Problem List:     Screen for colon cancer risk No results found for: RPR   Hepatitis C Screening Screen pts at high risk plus screen one time for adults born [address] [facility] Hepatitis C Virus Antibody (no units)   Date Value   12/07/2017 Nonreactive       Tuberculosis Screen If high risk No components Used    Alcohol use:  Yes      Alcohol/week: 0.0 oz      Comment: occ BEER    Drug use: No    Occ: works : div      REVIEW OF SYSTEMS:   GENERAL: feels anxious otherwise  SKIN: denies any unusual skin lesions  EYES: denies blurred vision or double vi themselves or others. Patient was orientated to time place and person. Mood and Affect: positive for anxiety  No agitation, hypomania or lability.    Judgment and Insight :  Patient's everyday activities and social situations appear to be normal.   I (CPT=71046); Future  - Losartan Potassium 50 MG Oral Tab; Take 1 tablet (50 mg total) by mouth daily. Dispense: 90 tablet; Refill: 1    4. Anxiety  Continue med. - escitalopram 20 MG Oral Tab; Take 1 tablet (20 mg total) by mouth daily.   Dispense: 30 tab

## 2018-12-08 ENCOUNTER — LAB ENCOUNTER (OUTPATIENT)
Dept: LAB | Age: 53
End: 2018-12-08
Attending: FAMILY MEDICINE
Payer: COMMERCIAL

## 2018-12-08 ENCOUNTER — HOSPITAL ENCOUNTER (OUTPATIENT)
Dept: GENERAL RADIOLOGY | Age: 53
Discharge: HOME | End: 2018-12-08
Attending: FAMILY MEDICINE
Payer: COMMERCIAL

## 2018-12-08 DIAGNOSIS — F41.9 ANXIETY: ICD-10-CM

## 2018-12-08 DIAGNOSIS — I10 ESSENTIAL HYPERTENSION: ICD-10-CM

## 2018-12-08 DIAGNOSIS — N19 RENAL FAILURE, UNSPECIFIED CHRONICITY: ICD-10-CM

## 2018-12-08 DIAGNOSIS — Z00.00 ANNUAL PHYSICAL EXAM: ICD-10-CM

## 2018-12-08 DIAGNOSIS — Z00.00 ANNUAL PHYSICAL EXAM: Primary | ICD-10-CM

## 2018-12-08 PROCEDURE — 84443 ASSAY THYROID STIM HORMONE: CPT

## 2018-12-08 PROCEDURE — 80053 COMPREHEN METABOLIC PANEL: CPT

## 2018-12-08 PROCEDURE — 71046 X-RAY EXAM CHEST 2 VIEWS: CPT | Performed by: FAMILY MEDICINE

## 2018-12-08 PROCEDURE — 93005 ELECTROCARDIOGRAM TRACING: CPT

## 2018-12-08 PROCEDURE — 84402 ASSAY OF FREE TESTOSTERONE: CPT

## 2018-12-08 PROCEDURE — 84403 ASSAY OF TOTAL TESTOSTERONE: CPT

## 2018-12-08 PROCEDURE — 36415 COLL VENOUS BLD VENIPUNCTURE: CPT

## 2018-12-08 PROCEDURE — 80061 LIPID PANEL: CPT

## 2018-12-08 PROCEDURE — 85025 COMPLETE CBC W/AUTO DIFF WBC: CPT

## 2018-12-08 PROCEDURE — 82306 VITAMIN D 25 HYDROXY: CPT

## 2018-12-08 PROCEDURE — 93010 ELECTROCARDIOGRAM REPORT: CPT | Performed by: FAMILY MEDICINE

## 2019-01-07 ENCOUNTER — OFFICE VISIT (OUTPATIENT)
Dept: FAMILY MEDICINE CLINIC | Facility: CLINIC | Age: 54
End: 2019-01-07
Payer: COMMERCIAL

## 2019-01-07 ENCOUNTER — APPOINTMENT (OUTPATIENT)
Dept: LAB | Age: 54
End: 2019-01-07
Attending: FAMILY MEDICINE
Payer: COMMERCIAL

## 2019-01-07 ENCOUNTER — HOSPITAL ENCOUNTER (OUTPATIENT)
Dept: GENERAL RADIOLOGY | Age: 54
Discharge: HOME | End: 2019-01-07
Attending: FAMILY MEDICINE
Payer: COMMERCIAL

## 2019-01-07 VITALS
SYSTOLIC BLOOD PRESSURE: 130 MMHG | HEIGHT: 73 IN | TEMPERATURE: 98 F | DIASTOLIC BLOOD PRESSURE: 85 MMHG | BODY MASS INDEX: 29.95 KG/M2 | HEART RATE: 91 BPM | WEIGHT: 226 LBS

## 2019-01-07 DIAGNOSIS — R79.89 ELEVATED TESTOSTERONE LEVEL: ICD-10-CM

## 2019-01-07 DIAGNOSIS — R73.9 ELEVATED BLOOD SUGAR: ICD-10-CM

## 2019-01-07 DIAGNOSIS — M75.101 ROTATOR CUFF SYNDROME OF RIGHT SHOULDER: ICD-10-CM

## 2019-01-07 DIAGNOSIS — R73.9 ELEVATED BLOOD SUGAR: Primary | ICD-10-CM

## 2019-01-07 LAB
EST. AVERAGE GLUCOSE BLD GHB EST-MCNC: 171 MG/DL (ref 68–126)
FSH SERPL-ACNC: 6.8 MIU/ML (ref 1.3–19.3)
HBA1C MFR BLD HPLC: 7.6 % (ref ?–5.7)
LH SERPL-ACNC: 5.8 MIU/ML (ref 1.2–8.6)

## 2019-01-07 PROCEDURE — 83001 ASSAY OF GONADOTROPIN (FSH): CPT

## 2019-01-07 PROCEDURE — 73030 X-RAY EXAM OF SHOULDER: CPT | Performed by: FAMILY MEDICINE

## 2019-01-07 PROCEDURE — 83002 ASSAY OF GONADOTROPIN (LH): CPT

## 2019-01-07 PROCEDURE — 99212 OFFICE O/P EST SF 10 MIN: CPT | Performed by: FAMILY MEDICINE

## 2019-01-07 PROCEDURE — 83036 HEMOGLOBIN GLYCOSYLATED A1C: CPT

## 2019-01-07 PROCEDURE — 84402 ASSAY OF FREE TESTOSTERONE: CPT

## 2019-01-07 PROCEDURE — 36415 COLL VENOUS BLD VENIPUNCTURE: CPT

## 2019-01-07 PROCEDURE — 84403 ASSAY OF TOTAL TESTOSTERONE: CPT

## 2019-01-07 PROCEDURE — 99214 OFFICE O/P EST MOD 30 MIN: CPT | Performed by: FAMILY MEDICINE

## 2019-01-07 NOTE — PROGRESS NOTES
Has dropped weight    Had high testosterone    gurpreet sugars 176    Now has pain in left shoulder  Hurts to move the right arm. Rt shoulder   Pos apprehenison  Pos infraspinatous  Mildly resticted rom of right shoulder    A/p  1.  Elevated blood sugar  rech

## 2019-01-10 LAB
TESTOSTERONE, FREE, S: 11.3 NG/DL
TESTOSTERONE, TOTAL, S: 538 NG/DL

## 2019-01-14 ENCOUNTER — TELEPHONE (OUTPATIENT)
Dept: FAMILY MEDICINE CLINIC | Facility: CLINIC | Age: 54
End: 2019-01-14

## 2019-01-14 NOTE — TELEPHONE ENCOUNTER
Pt called in stating that he received a call from the office stating to return call. They did not give a reason and CSS did not see an encounter regarding the call. Please advise.

## 2019-01-14 NOTE — TELEPHONE ENCOUNTER
No notes stating clinical staff tried contacted patient. Patient states he is already aware of test results.

## 2019-02-04 ENCOUNTER — OFFICE VISIT (OUTPATIENT)
Dept: ENDOCRINOLOGY CLINIC | Facility: CLINIC | Age: 54
End: 2019-02-04
Payer: COMMERCIAL

## 2019-02-04 VITALS
BODY MASS INDEX: 31.04 KG/M2 | DIASTOLIC BLOOD PRESSURE: 79 MMHG | HEIGHT: 73 IN | WEIGHT: 234.19 LBS | SYSTOLIC BLOOD PRESSURE: 126 MMHG | HEART RATE: 73 BPM

## 2019-02-04 DIAGNOSIS — R79.89 HIGH SERUM TESTOSTERONE: Primary | ICD-10-CM

## 2019-02-04 PROCEDURE — 99212 OFFICE O/P EST SF 10 MIN: CPT | Performed by: INTERNAL MEDICINE

## 2019-02-04 PROCEDURE — 99243 OFF/OP CNSLTJ NEW/EST LOW 30: CPT | Performed by: INTERNAL MEDICINE

## 2019-02-04 NOTE — H&P
New Patient Evaluation - History and Physical    CONSULT - Reason for Visit:  High Free testosterone levels.    Requesting Physician: Dr. Tata Wilson:  Patient presents with:  Consult: Elevated testosterone levels       HISTORY OF PRESENT [place] tobacco: Never Used    Substance and Sexual Activity      Alcohol use:  Yes        Alcohol/week: 0.0 oz        Comment: occ BEER      Drug use: No    Other Topics      Concerns:        Caffeine Concern: Yes          Soda > 6 cups daily      FAMILY HISTORY: bleeding, no rashes and no lesions        DATA:     Pertinent data reviewed. ASSESSMENT AND PLAN:    Patient is a 48year old male who is her for evaluation of T levels. Noted to have elevation in Free T level x once.    Repeat levels normal  TT pradeep

## 2019-03-09 DIAGNOSIS — Z00.00 ANNUAL PHYSICAL EXAM: ICD-10-CM

## 2019-03-09 DIAGNOSIS — F41.9 ANXIETY: ICD-10-CM

## 2019-03-09 RX ORDER — ESCITALOPRAM OXALATE 20 MG/1
20 TABLET ORAL DAILY
Qty: 30 TABLET | Refills: 1 | Status: SHIPPED | OUTPATIENT
Start: 2019-03-09 | End: 2019-06-01

## 2019-03-10 NOTE — TELEPHONE ENCOUNTER
Refill Protocol Appointment Criteria  · Appointment scheduled in the past 6 months or in the next 3 months  Recent Outpatient Visits            1 month ago High serum C/ [address] [facility]

## 2019-03-27 DIAGNOSIS — F41.9 ANXIETY: ICD-10-CM

## 2019-03-27 DIAGNOSIS — Z00.00 ANNUAL PHYSICAL EXAM: ICD-10-CM

## 2019-03-27 NOTE — TELEPHONE ENCOUNTER
Refill Protocol Appointment Criteria  · Appointment scheduled in the past 6 months or in the next 3 months  Recent Outpatient Visits            1 month ago High serum C/ [address] [facility]

## 2019-03-28 RX ORDER — ESCITALOPRAM OXALATE 20 MG/1
20 TABLET ORAL DAILY
Qty: 90 TABLET | Refills: 0 | OUTPATIENT
Start: 2019-03-28

## 2019-04-03 DIAGNOSIS — F41.9 ANXIETY: ICD-10-CM

## 2019-04-03 DIAGNOSIS — N20.0 RECURRENT NEPHROLITHIASIS: ICD-10-CM

## 2019-04-03 DIAGNOSIS — Z00.00 ANNUAL PHYSICAL EXAM: ICD-10-CM

## 2019-04-03 DIAGNOSIS — N21.0 URIC ACID BLADDER STONE: ICD-10-CM

## 2019-04-03 RX ORDER — ESCITALOPRAM OXALATE 20 MG/1
20 TABLET ORAL DAILY
Qty: 30 TABLET | Refills: 1 | OUTPATIENT
Start: 2019-04-03

## 2019-04-04 RX ORDER — ESCITALOPRAM OXALATE 20 MG/1
20 TABLET ORAL DAILY
Qty: 30 TABLET | Refills: 1 | OUTPATIENT
Start: 2019-04-04

## 2019-04-04 RX ORDER — ALLOPURINOL 300 MG/1
300 TABLET ORAL DAILY
Qty: 90 TABLET | Refills: 3 | OUTPATIENT
Start: 2019-04-04

## 2019-04-04 RX ORDER — ALLOPURINOL 300 MG/1
300 TABLET ORAL DAILY
Qty: 90 TABLET | Refills: 3 | Status: SHIPPED | OUTPATIENT
Start: 2019-04-04 | End: 2019-07-06

## 2019-04-04 NOTE — TELEPHONE ENCOUNTER
Refill passed per [facility] Protocol. Dr [facility] refilled escitalopram 20 mg daily on 3/9/19 quantity 30+1 additional refill. Allopurinal 300 mg daily quantity 90 + 3 additional refill.      Refill Protocol Appointment Criteria  · Appointment schedul

## 2019-06-01 DIAGNOSIS — I10 ESSENTIAL HYPERTENSION: ICD-10-CM

## 2019-06-01 DIAGNOSIS — F41.9 ANXIETY: ICD-10-CM

## 2019-06-01 DIAGNOSIS — Z00.00 ANNUAL PHYSICAL EXAM: ICD-10-CM

## 2019-06-01 DIAGNOSIS — N19 RENAL FAILURE, UNSPECIFIED CHRONICITY: ICD-10-CM

## 2019-06-01 RX ORDER — LOSARTAN POTASSIUM 50 MG/1
TABLET ORAL
Qty: 90 TABLET | Refills: 1 | Status: SHIPPED | OUTPATIENT
Start: 2019-06-01 | End: 2019-07-06

## 2019-06-01 RX ORDER — ESCITALOPRAM OXALATE 20 MG/1
TABLET ORAL
Qty: 90 TABLET | Refills: 1 | Status: SHIPPED | OUTPATIENT
Start: 2019-06-01 | End: 2019-07-06

## 2019-07-03 ENCOUNTER — TELEPHONE (OUTPATIENT)
Dept: FAMILY MEDICINE CLINIC | Facility: CLINIC | Age: 54
End: 2019-07-03

## 2019-07-03 NOTE — TELEPHONE ENCOUNTER
The weight loss is worrisome with high blood sugar history. Patient may be entering a diabetic coma. He needs to go to [facility] for immediate evaluation.

## 2019-07-03 NOTE — TELEPHONE ENCOUNTER
Pt states he has been having difficulty seeing and has high blood sugar, states he is borderline diabetic. Pt states his vision is blurred and worse at nighttime. Pt has not seen ophthalmologist as he wants to see Dr. Elodia Potts first. Pt states over the last 1 1/2 months he lost 35 lbs due to stress. Pt is monitoring diet and will start trying to exercise more as his stress level is decreased. Pt states he also has increased urination. Advised pt schedule appt sooner than 8/17/19. Appt scheduled with Tahira Santana 7/6/19. Pt asking if he should have lab work done prior to [address] Also advised pt to go to ER if symptoms worsen. Please advise.

## 2019-07-03 NOTE — TELEPHONE ENCOUNTER
Pt advised , stated he was out of town will keep appt for Saturday -advised if s/s worsen go to ER, Pt agreed

## 2019-07-06 ENCOUNTER — LAB ENCOUNTER (OUTPATIENT)
Dept: LAB | Age: 54
End: 2019-07-06
Attending: PHYSICIAN ASSISTANT
Payer: COMMERCIAL

## 2019-07-06 ENCOUNTER — OFFICE VISIT (OUTPATIENT)
Dept: FAMILY MEDICINE CLINIC | Facility: CLINIC | Age: 54
End: 2019-07-06
Payer: COMMERCIAL

## 2019-07-06 VITALS
DIASTOLIC BLOOD PRESSURE: 80 MMHG | WEIGHT: 209.63 LBS | HEART RATE: 71 BPM | SYSTOLIC BLOOD PRESSURE: 126 MMHG | TEMPERATURE: 98 F | BODY MASS INDEX: 27.78 KG/M2 | RESPIRATION RATE: 15 BRPM | HEIGHT: 73 IN

## 2019-07-06 DIAGNOSIS — E11.9 DIABETES MELLITUS WITHOUT COMPLICATION (HCC): ICD-10-CM

## 2019-07-06 DIAGNOSIS — E11.9 DIABETES MELLITUS WITHOUT COMPLICATION (HCC): Primary | ICD-10-CM

## 2019-07-06 LAB
ALT SERPL-CCNC: 51 U/L (ref 16–61)
ANION GAP SERPL CALC-SCNC: 5 MMOL/L (ref 0–18)
AST SERPL-CCNC: 39 U/L (ref 15–37)
BASOPHILS # BLD AUTO: 0.05 X10(3) UL (ref 0–0.2)
BASOPHILS NFR BLD AUTO: 0.9 %
BUN BLD-MCNC: 9 MG/DL (ref 7–18)
BUN/CREAT SERPL: 7.9 (ref 10–20)
CALCIUM BLD-MCNC: 8.6 MG/DL (ref 8.5–10.1)
CHLORIDE SERPL-SCNC: 106 MMOL/L (ref 98–112)
CHOLEST SMN-MCNC: 194 MG/DL (ref ?–200)
CO2 SERPL-SCNC: 28 MMOL/L (ref 21–32)
COMPLEXED PSA SERPL-MCNC: 1.79 NG/ML (ref ?–4)
CREAT BLD-MCNC: 1.14 MG/DL (ref 0.7–1.3)
DEPRECATED RDW RBC AUTO: 39 FL (ref 35.1–46.3)
EOSINOPHIL # BLD AUTO: 0.08 X10(3) UL (ref 0–0.7)
EOSINOPHIL NFR BLD AUTO: 1.4 %
ERYTHROCYTE [DISTWIDTH] IN BLOOD BY AUTOMATED COUNT: 11.9 % (ref 11–15)
EST. AVERAGE GLUCOSE BLD GHB EST-MCNC: 298 MG/DL (ref 68–126)
GLUCOSE BLD-MCNC: 310 MG/DL (ref 70–99)
GLUCOSE BLOOD: 289
HBA1C MFR BLD HPLC: 12 % (ref ?–5.7)
HCT VFR BLD AUTO: 48.9 % (ref 39–53)
HDLC SERPL-MCNC: 36 MG/DL (ref 40–59)
HGB BLD-MCNC: 16.9 G/DL (ref 13–17.5)
IMM GRANULOCYTES # BLD AUTO: 0.02 X10(3) UL (ref 0–1)
IMM GRANULOCYTES NFR BLD: 0.3 %
LDLC SERPL CALC-MCNC: 109 MG/DL (ref ?–100)
LYMPHOCYTES # BLD AUTO: 2.4 X10(3) UL (ref 1–4)
LYMPHOCYTES NFR BLD AUTO: 41.3 %
MCH RBC QN AUTO: 31.4 PG (ref 26–34)
MCHC RBC AUTO-ENTMCNC: 34.6 G/DL (ref 31–37)
MCV RBC AUTO: 90.7 FL (ref 80–100)
MONOCYTES # BLD AUTO: 0.49 X10(3) UL (ref 0.1–1)
MONOCYTES NFR BLD AUTO: 8.4 %
NEUTROPHILS # BLD AUTO: 2.77 X10 (3) UL (ref 1.5–7.7)
NEUTROPHILS # BLD AUTO: 2.77 X10(3) UL (ref 1.5–7.7)
NEUTROPHILS NFR BLD AUTO: 47.7 %
NONHDLC SERPL-MCNC: 158 MG/DL (ref ?–130)
OSMOLALITY SERPL CALC.SUM OF ELEC: 298 MOSM/KG (ref 275–295)
PATIENT FASTING: YES
PATIENT FASTING: YES
PLATELET # BLD AUTO: 156 10(3)UL (ref 150–450)
POTASSIUM SERPL-SCNC: 4.9 MMOL/L (ref 3.5–5.1)
RBC # BLD AUTO: 5.39 X10(6)UL (ref 4.3–5.7)
SODIUM SERPL-SCNC: 139 MMOL/L (ref 136–145)
TEST STRIP LOT #: ABNORMAL NUMERIC
TRIGL SERPL-MCNC: 246 MG/DL (ref 30–149)
TSI SER-ACNC: 1.64 MIU/ML (ref 0.36–3.74)
VLDLC SERPL CALC-MCNC: 49 MG/DL (ref 0–30)
WBC # BLD AUTO: 5.8 X10(3) UL (ref 4–11)

## 2019-07-06 PROCEDURE — 80061 LIPID PANEL: CPT

## 2019-07-06 PROCEDURE — 36415 COLL VENOUS BLD VENIPUNCTURE: CPT

## 2019-07-06 PROCEDURE — 36416 COLLJ CAPILLARY BLOOD SPEC: CPT | Performed by: PHYSICIAN ASSISTANT

## 2019-07-06 PROCEDURE — 84450 TRANSFERASE (AST) (SGOT): CPT

## 2019-07-06 PROCEDURE — 85025 COMPLETE CBC W/AUTO DIFF WBC: CPT

## 2019-07-06 PROCEDURE — 83036 HEMOGLOBIN GLYCOSYLATED A1C: CPT

## 2019-07-06 PROCEDURE — 99214 OFFICE O/P EST MOD 30 MIN: CPT | Performed by: PHYSICIAN ASSISTANT

## 2019-07-06 PROCEDURE — 80048 BASIC METABOLIC PNL TOTAL CA: CPT

## 2019-07-06 PROCEDURE — 84460 ALANINE AMINO (ALT) (SGPT): CPT

## 2019-07-06 PROCEDURE — 82962 GLUCOSE BLOOD TEST: CPT | Performed by: PHYSICIAN ASSISTANT

## 2019-07-06 PROCEDURE — 84443 ASSAY THYROID STIM HORMONE: CPT

## 2019-07-06 RX ORDER — LISINOPRIL 2.5 MG/1
2.5 TABLET ORAL DAILY
Qty: 90 TABLET | Refills: 1 | Status: SHIPPED | OUTPATIENT
Start: 2019-07-06 | End: 2019-10-04

## 2019-07-06 NOTE — ASSESSMENT & PLAN NOTE
Start Metformin 1000 mg PO BID. Patient refuses to see Dietician at this time. Goals  1. Preprandial glucose targets  mg/dL. 2. Systolic blood pressure < 130.  And diastolic blood pressure < 80.  3. Hemoglobin A1C < 7%.  4. LDL Cholesterol: LDL Goal

## 2019-07-06 NOTE — PROGRESS NOTES
HPI:   HPI  47year-old male is here in the office complaining of blurry vision for the past 2 weeks. Patient states that he lost 35 lbs, has increased urination, thirsty. Patient was diagnosis of type 2 DM, but does not take medication.   Patient denies of Smoking status: Never Smoker      Smokeless tobacco: Never Used    Substance and Sexual Activity      Alcohol use:  Yes        Alcohol/week: 0.0 oz        Comment: occ BEER      Drug use: No      Sexual activity: Not on file    Lifestyle      Physical activ Negative. Negative for nausea, vomiting, abdominal pain, diarrhea, constipation and abdominal distention. Neurological: Negative for dizziness, syncope and headaches.         07/06/19  0839   BP: 126/80   Pulse: 71   Resp: 15   Temp: 97.9 °F (36.6 °C) directed. 2. Glucose monitoring should be performed: 2 times per day    Lifestyle modifications discussed with patient:   a. Exercise at least 3 days/week with no more than 2 consecutive days without  exercise  b.  Weight loss goal at least >5% of body francia

## 2019-07-30 ENCOUNTER — OFFICE VISIT (OUTPATIENT)
Dept: OPTOMETRY | Facility: CLINIC | Age: 54
End: 2019-07-30
Payer: COMMERCIAL

## 2019-07-30 DIAGNOSIS — E11.9 DIABETES MELLITUS WITHOUT COMPLICATION (HCC): Primary | ICD-10-CM

## 2019-07-30 DIAGNOSIS — H52.4 PRESBYOPIA: ICD-10-CM

## 2019-07-30 DIAGNOSIS — H40.003 GLAUCOMA SUSPECT, BILATERAL: ICD-10-CM

## 2019-07-30 DIAGNOSIS — H25.13 AGE-RELATED NUCLEAR CATARACT OF BOTH EYES: ICD-10-CM

## 2019-07-30 PROCEDURE — 92004 COMPRE OPH EXAM NEW PT 1/>: CPT | Performed by: OPTOMETRIST

## 2019-07-30 NOTE — PATIENT INSTRUCTIONS
Presbyopia  Patient will use +1.50 OTC for far and +3.00 for near and RTO for a refraction in 6 weeks. Glaucoma suspect, bilateral  Recommend a VF OCT and pachy due to enlarged CD ratio. Appt made.     Age-related nuclear cataract of both eyes  No treatm

## 2019-07-30 NOTE — PROGRESS NOTES
Ck Wells is a 47year old male. HPI:     HPI     Diabetic Eye Exam     Diabetes characteristics include controlled with diet, Type 2 and taking oral medications. Duration of 6 months. Number of years diabetic: 6 months.   Number of years on pills: Constitutional, Gastrointestinal, Neurological, Skin, Genitourinary, Musculoskeletal, HENT, Endocrine, Cardiovascular, Eyes, Respiratory, Psychiatric, Allergic/Imm, Heme/Lymph    Last edited by Cody Spring, OD on 7/30/2019  3:51 PM. (History)          PHYS Recommend no glasses RX at this time. Recommend he use +1.50 for far and +3.00 for near. ASSESSMENT/PLAN:     Diagnoses and Plan:     Presbyopia  Patient will use +1.50 OTC for far and +3.00 for near and RTO for a refraction in 6 weeks.

## 2019-08-19 ENCOUNTER — OFFICE VISIT (OUTPATIENT)
Dept: FAMILY MEDICINE CLINIC | Facility: CLINIC | Age: 54
End: 2019-08-19
Payer: COMMERCIAL

## 2019-08-19 VITALS
HEART RATE: 94 BPM | TEMPERATURE: 98 F | WEIGHT: 218 LBS | SYSTOLIC BLOOD PRESSURE: 124 MMHG | BODY MASS INDEX: 29 KG/M2 | DIASTOLIC BLOOD PRESSURE: 74 MMHG

## 2019-08-19 DIAGNOSIS — E11.65 UNCONTROLLED TYPE 2 DIABETES MELLITUS WITH HYPERGLYCEMIA (HCC): ICD-10-CM

## 2019-08-19 DIAGNOSIS — E78.5 DYSLIPIDEMIA: Primary | ICD-10-CM

## 2019-08-19 DIAGNOSIS — Z12.5 SCREENING FOR PROSTATE CANCER: ICD-10-CM

## 2019-08-19 PROCEDURE — 99214 OFFICE O/P EST MOD 30 MIN: CPT | Performed by: FAMILY MEDICINE

## 2019-08-19 RX ORDER — ROSUVASTATIN CALCIUM 5 MG/1
5 TABLET, COATED ORAL NIGHTLY
Qty: 90 TABLET | Refills: 3 | Status: SHIPPED | OUTPATIENT
Start: 2019-08-19 | End: 2019-12-17

## 2019-08-19 NOTE — PROGRESS NOTES
Sugars were in the 400's  Now in am 120-160 in am  Now I can see again. Son is at [place] is doing well. Had loss of weight. On metformin 500 2 po bid   Lisinopril working be ok.     \"Life is back to normal\"  Used to weigh 243      Patient denies majority of time was spent in discussion with 25 minutes spent discussing these issues. More than 50% of our time was spent in discussion.

## 2019-08-20 ENCOUNTER — PATIENT MESSAGE (OUTPATIENT)
Dept: FAMILY MEDICINE CLINIC | Facility: CLINIC | Age: 54
End: 2019-08-20

## 2019-08-22 RX ORDER — ALOGLIPTIN 25 MG/1
25 TABLET, FILM COATED ORAL DAILY
Qty: 90 TABLET | Refills: 3 | Status: SHIPPED | OUTPATIENT
Start: 2019-08-22 | End: 2019-09-21

## 2019-09-17 ENCOUNTER — OFFICE VISIT (OUTPATIENT)
Dept: OPTOMETRY | Facility: CLINIC | Age: 54
End: 2019-09-17
Payer: COMMERCIAL

## 2019-09-17 DIAGNOSIS — H52.4 PRESBYOPIA: Primary | ICD-10-CM

## 2019-09-17 PROCEDURE — 92015 DETERMINE REFRACTIVE STATE: CPT | Performed by: OPTOMETRIST

## 2019-09-17 NOTE — PROGRESS NOTES
Shoshana Schroeder is a 47year old male. HPI:     HPI     Patient was in for a DFE for diabetes on 7/30/19. Vision was fluctuating so I asked him to come back for a refraction when his BS was stable.  He sees better at distance and only uses reading glasses [place]  20/20 +2.00    Left [place] -0.25 165 20/20 +2.00          Manifest Refraction Comments    Rx is now stable. He wants to stay with OTC reading glasses.  Recommend +2.00 or +2.25.                  ASSESSMENT/PLAN:     Diagnoses and Plan:     Presbyopia

## 2019-09-29 NOTE — TELEPHONE ENCOUNTER
Please review; protocol failed.     Requested Prescriptions     Pending Prescriptions Disp Refills   • METFORMIN  MG Oral Tab [Pharmacy Med Name: METFORMIN 500MG TABLETS] 180 tablet 0     Sig: TAKE 2 TABLETS BY MOUTH TWICE DAILY WITH MEALS         Re

## 2019-10-10 ENCOUNTER — TELEPHONE (OUTPATIENT)
Dept: FAMILY MEDICINE CLINIC | Facility: CLINIC | Age: 54
End: 2019-10-10

## 2019-10-10 NOTE — TELEPHONE ENCOUNTER
Dr. Lindsay Topete received letter from [facility] with test results. Notified patient with normal tests, pt verbalized understanding.

## 2019-11-16 ENCOUNTER — APPOINTMENT (OUTPATIENT)
Dept: LAB | Age: 54
End: 2019-11-16
Attending: FAMILY MEDICINE
Payer: COMMERCIAL

## 2019-11-16 DIAGNOSIS — Z12.5 SCREENING FOR PROSTATE CANCER: ICD-10-CM

## 2019-11-16 DIAGNOSIS — E78.5 DYSLIPIDEMIA: ICD-10-CM

## 2019-11-16 DIAGNOSIS — E11.65 UNCONTROLLED TYPE 2 DIABETES MELLITUS WITH HYPERGLYCEMIA (HCC): ICD-10-CM

## 2019-11-16 PROCEDURE — 80061 LIPID PANEL: CPT

## 2019-11-16 PROCEDURE — 82570 ASSAY OF URINE CREATININE: CPT

## 2019-11-16 PROCEDURE — 82043 UR ALBUMIN QUANTITATIVE: CPT

## 2019-11-16 PROCEDURE — 36415 COLL VENOUS BLD VENIPUNCTURE: CPT

## 2019-11-16 PROCEDURE — 80053 COMPREHEN METABOLIC PANEL: CPT

## 2019-11-16 PROCEDURE — 83036 HEMOGLOBIN GLYCOSYLATED A1C: CPT

## 2019-12-17 ENCOUNTER — OFFICE VISIT (OUTPATIENT)
Dept: FAMILY MEDICINE CLINIC | Facility: CLINIC | Age: 54
End: 2019-12-17
Payer: COMMERCIAL

## 2019-12-17 VITALS
RESPIRATION RATE: 22 BRPM | SYSTOLIC BLOOD PRESSURE: 135 MMHG | HEART RATE: 75 BPM | WEIGHT: 224 LBS | BODY MASS INDEX: 29.69 KG/M2 | HEIGHT: 73 IN | DIASTOLIC BLOOD PRESSURE: 84 MMHG

## 2019-12-17 DIAGNOSIS — H25.13 AGE-RELATED NUCLEAR CATARACT OF BOTH EYES: ICD-10-CM

## 2019-12-17 DIAGNOSIS — N20.0 RECURRENT NEPHROLITHIASIS: ICD-10-CM

## 2019-12-17 DIAGNOSIS — E11.9 DIABETES MELLITUS WITHOUT COMPLICATION (HCC): ICD-10-CM

## 2019-12-17 DIAGNOSIS — N20.0 KIDNEY STONE: ICD-10-CM

## 2019-12-17 DIAGNOSIS — Z12.11 SCREEN FOR COLON CANCER: ICD-10-CM

## 2019-12-17 DIAGNOSIS — F10.10 EXCESSIVE DRINKING ALCOHOL: ICD-10-CM

## 2019-12-17 DIAGNOSIS — K76.0 HEPATIC STEATOSIS: ICD-10-CM

## 2019-12-17 DIAGNOSIS — Z23 ENCOUNTER FOR IMMUNIZATION: ICD-10-CM

## 2019-12-17 DIAGNOSIS — Z00.00 ANNUAL PHYSICAL EXAM: Primary | ICD-10-CM

## 2019-12-17 DIAGNOSIS — N17.9 AKI (ACUTE KIDNEY INJURY) (HCC): ICD-10-CM

## 2019-12-17 DIAGNOSIS — H40.003 GLAUCOMA SUSPECT, BILATERAL: ICD-10-CM

## 2019-12-17 DIAGNOSIS — E78.00 HIGH CHOLESTEROL: ICD-10-CM

## 2019-12-17 PROBLEM — H52.4 PRESBYOPIA: Status: RESOLVED | Noted: 2019-07-30 | Resolved: 2019-12-17

## 2019-12-17 PROCEDURE — 90750 HZV VACC RECOMBINANT IM: CPT | Performed by: FAMILY MEDICINE

## 2019-12-17 PROCEDURE — 90472 IMMUNIZATION ADMIN EACH ADD: CPT | Performed by: FAMILY MEDICINE

## 2019-12-17 PROCEDURE — 90471 IMMUNIZATION ADMIN: CPT | Performed by: FAMILY MEDICINE

## 2019-12-17 PROCEDURE — 90670 PCV13 VACCINE IM: CPT | Performed by: FAMILY MEDICINE

## 2019-12-17 PROCEDURE — 99396 PREV VISIT EST AGE 40-64: CPT | Performed by: FAMILY MEDICINE

## 2019-12-17 PROCEDURE — 90686 IIV4 VACC NO PRSV 0.5 ML IM: CPT | Performed by: FAMILY MEDICINE

## 2019-12-17 RX ORDER — ROSUVASTATIN CALCIUM 10 MG/1
10 TABLET, COATED ORAL NIGHTLY
Qty: 90 TABLET | Refills: 3 | Status: SHIPPED | OUTPATIENT
Start: 2019-12-17 | End: 2021-03-25

## 2019-12-17 RX ORDER — LISINOPRIL 10 MG/1
10 TABLET ORAL DAILY
Qty: 90 TABLET | Refills: 3 | Status: SHIPPED | OUTPATIENT
Start: 2019-12-17 | End: 2020-10-02

## 2019-12-17 RX ORDER — LISINOPRIL 2.5 MG/1
TABLET ORAL
COMMUNITY
Start: 2019-10-01 | End: 2019-12-17

## 2019-12-17 NOTE — H&P
REASON FOR VISIT:    Veronica Dawn is a 47year old male who presents for an [address]. Trouble sleeping fragmented sleep but \"I always do good during the day I don't get sleepy. \"    Patient Active Problem List:     Screen for colon can high risk No components found for: [address] Internal Lab or Procedure     Annual Monitoring of Persistent Medications  (ACE/ARB, Digoxin, Diuretics)        Potassium  Annually Potassium (mmol/L)   Date Value   11/16/2019 HISTORY:   Social History    Tobacco Use      Smoking status: Never Smoker      Smokeless tobacco: Never Used    Alcohol use:  Yes      Alcohol/week: 0.0 standard drinks      Comment: occ BEER    Drug use: No    Occ: workds : no      REVIEW OF SYSTEM physical exam  stable    2. Encounter for immunization  given  - FLULAVAL INFLUENZA VACCINE QUAD PRESERVATIVE FREE 0.5 ML  - ZOSTER VACC RECOMBINANT IM NJX  - PNEUMOCOCCAL VACC, 13 JUAN DAVID IM    3. Hepatic steatosis  stable    4.  Excessive drinking alcohol  st immune

## 2020-02-24 ENCOUNTER — NURSE ONLY (OUTPATIENT)
Dept: FAMILY MEDICINE CLINIC | Facility: CLINIC | Age: 55
End: 2020-02-24
Payer: COMMERCIAL

## 2020-02-24 DIAGNOSIS — Z23 ENCOUNTER FOR IMMUNIZATION: Primary | ICD-10-CM

## 2020-02-24 PROCEDURE — 90471 IMMUNIZATION ADMIN: CPT | Performed by: FAMILY MEDICINE

## 2020-02-24 PROCEDURE — 90750 HZV VACC RECOMBINANT IM: CPT | Performed by: FAMILY MEDICINE

## 2020-04-18 ENCOUNTER — APPOINTMENT (OUTPATIENT)
Dept: LAB | Age: 55
End: 2020-04-18
Attending: FAMILY MEDICINE
Payer: COMMERCIAL

## 2020-04-18 DIAGNOSIS — E11.9 DIABETES MELLITUS WITHOUT COMPLICATION (HCC): ICD-10-CM

## 2020-04-18 DIAGNOSIS — E78.00 HIGH CHOLESTEROL: ICD-10-CM

## 2020-04-18 PROCEDURE — 82570 ASSAY OF URINE CREATININE: CPT

## 2020-04-18 PROCEDURE — 80061 LIPID PANEL: CPT

## 2020-04-18 PROCEDURE — 82043 UR ALBUMIN QUANTITATIVE: CPT

## 2020-04-18 PROCEDURE — 80053 COMPREHEN METABOLIC PANEL: CPT

## 2020-04-18 PROCEDURE — 36415 COLL VENOUS BLD VENIPUNCTURE: CPT

## 2020-04-18 PROCEDURE — 83036 HEMOGLOBIN GLYCOSYLATED A1C: CPT

## 2020-04-20 ENCOUNTER — PATIENT MESSAGE (OUTPATIENT)
Dept: FAMILY MEDICINE CLINIC | Facility: CLINIC | Age: 55
End: 2020-04-20

## 2020-04-20 DIAGNOSIS — N21.0 URIC ACID BLADDER STONE: ICD-10-CM

## 2020-04-20 DIAGNOSIS — N20.0 RECURRENT NEPHROLITHIASIS: ICD-10-CM

## 2020-04-21 RX ORDER — ALLOPURINOL 300 MG/1
300 TABLET ORAL DAILY
Qty: 90 TABLET | Refills: 3 | OUTPATIENT
Start: 2020-04-21

## 2020-04-21 NOTE — TELEPHONE ENCOUNTER
----- Message -----   From: Nikki Zaldivar   Sent: 4/21/2020  11:27 AM CDT   To: Rahat Blackwell Customer Response Pool   Subject: Medication                                         Topic: Selection      Yes to Ryne Garcia response to my question regarding getting my

## 2020-04-21 NOTE — TELEPHONE ENCOUNTER
To: Steven Grew      From: Efraín Sánchez RN      Created: 4/21/2020 11:09 AM        Prasanna Hernandez, for the allopurinol, are you taking 300 mg daily? Do your prescriptions go to [facility] in [facility] [address]?  Thank you, Amrita SEGAL

## 2020-04-21 NOTE — TELEPHONE ENCOUNTER
From: Eloisa Mahan  To: Wilbert Gregorio DO  Sent: 4/20/2020 8:03 PM CDT  Subject: Prescription Question    Can a prescription be sent to [place] for my allopurinol and metformin to be refilled please

## 2020-05-01 ENCOUNTER — TELEPHONE (OUTPATIENT)
Dept: SURGERY | Facility: CLINIC | Age: 55
End: 2020-05-01

## 2020-05-01 ENCOUNTER — APPOINTMENT (OUTPATIENT)
Dept: CT IMAGING | Facility: HOSPITAL | Age: 55
End: 2020-05-01
Attending: EMERGENCY MEDICINE
Payer: COMMERCIAL

## 2020-05-01 ENCOUNTER — HOSPITAL ENCOUNTER (EMERGENCY)
Facility: HOSPITAL | Age: 55
Discharge: HOME | End: 2020-05-01
Attending: EMERGENCY MEDICINE
Payer: COMMERCIAL

## 2020-05-01 VITALS
RESPIRATION RATE: 18 BRPM | BODY MASS INDEX: 29.16 KG/M2 | SYSTOLIC BLOOD PRESSURE: 126 MMHG | HEIGHT: 73 IN | WEIGHT: 220 LBS | TEMPERATURE: 98 F | DIASTOLIC BLOOD PRESSURE: 82 MMHG | OXYGEN SATURATION: 97 % | HEART RATE: 66 BPM

## 2020-05-01 DIAGNOSIS — N20.1 URETEROLITHIASIS: Primary | ICD-10-CM

## 2020-05-01 PROCEDURE — 96374 THER/PROPH/DIAG INJ IV PUSH: CPT

## 2020-05-01 PROCEDURE — 80048 BASIC METABOLIC PNL TOTAL CA: CPT | Performed by: EMERGENCY MEDICINE

## 2020-05-01 PROCEDURE — 99284 EMERGENCY DEPT VISIT MOD MDM: CPT

## 2020-05-01 PROCEDURE — 74176 CT ABD & PELVIS W/O CONTRAST: CPT | Performed by: EMERGENCY MEDICINE

## 2020-05-01 PROCEDURE — 85025 COMPLETE CBC W/AUTO DIFF WBC: CPT | Performed by: EMERGENCY MEDICINE

## 2020-05-01 PROCEDURE — 81001 URINALYSIS AUTO W/SCOPE: CPT | Performed by: EMERGENCY MEDICINE

## 2020-05-01 RX ORDER — IBUPROFEN 600 MG/1
600 TABLET ORAL EVERY 8 HOURS PRN
Qty: 30 TABLET | Refills: 0 | Status: SHIPPED | OUTPATIENT
Start: 2020-05-01 | End: 2020-05-08

## 2020-05-01 RX ORDER — ONDANSETRON 4 MG/1
4 TABLET, ORALLY DISINTEGRATING ORAL EVERY 4 HOURS PRN
Qty: 10 TABLET | Refills: 0 | Status: SHIPPED | OUTPATIENT
Start: 2020-05-01 | End: 2020-05-08

## 2020-05-01 RX ORDER — KETOROLAC TROMETHAMINE 30 MG/ML
30 INJECTION, SOLUTION INTRAMUSCULAR; INTRAVENOUS ONCE
Status: COMPLETED | OUTPATIENT
Start: 2020-05-01 | End: 2020-05-01

## 2020-05-01 RX ORDER — HYDROCODONE BITARTRATE AND ACETAMINOPHEN 5; 325 MG/1; MG/1
1 TABLET ORAL EVERY 6 HOURS PRN
Qty: 10 TABLET | Refills: 0 | Status: SHIPPED | OUTPATIENT
Start: 2020-05-01 | End: 2020-05-08

## 2020-05-01 NOTE — ED NOTES
Patient safe to DC home per MD. Justina Cantu to dress self. DC teaching done, instructions reviewed with patient including when and how to follow up with healthcare providers and when to seek emergency care. The patient verbalizes understanding.  Peripheral IV disc

## 2020-05-01 NOTE — TELEPHONE ENCOUNTER
S/W pt and determined that he is not in pain at this time as he was medicated in the ER. Also denies n/v or fever/chills at this time and he was prescribed pain med and Zofran.  I explained that I did not have any docs in the [facility] at this time and I offer

## 2020-05-01 NOTE — ED PROVIDER NOTES
Patient Seen in: [facility] AND [facility] Emergency Department      History   Patient presents with:  Abdominal Pain    Stated Complaint: kidney stone pt has hx of them     HPI    51-year-old male with history of nephrolithiasis, here with complaints of acute o Musculoskeletal: Negative for back pain. Skin: Negative for rash. Neurological: Negative for weakness, light-headedness and headaches. All other systems reviewed and are negative.       Positive for stated complaint: kidney stone pt has hx of them asymmetry, normal speech             ED Course     Pulse Oximeter:  Pulse oximetry on room air is 94%, indicating adequate oxygenation.     PROCEDURES:  none    DIAGNOSTICS:   Labs:  Recent Results (from the past 24 hour(s))   RAINBOW DRAW GOLD    Collectio Neutrophil Absolute 6.86 1.50 - 7.70 x10(3) uL    Lymphocyte Absolute 2.57 1.00 - 4.00 x10(3) uL    Monocyte Absolute 1.15 (H) 0.10 - 1.00 x10(3) uL    Eosinophil Absolute 0.08 0.00 - 0.70 x10(3) uL    Basophil Absolute 0.05 0.00 - 0.20 x10(3) uL    Immatu reports. Complicating Factors: The patient already has does not have any pertinent problems on file. to contribute to the complexity of his ED evaluation.     - pt  comfortable with d/c at this time, will d/c pt home now with Rx for norco, ibuprofen, zof

## 2020-05-04 ENCOUNTER — LAB ENCOUNTER (OUTPATIENT)
Dept: LAB | Facility: HOSPITAL | Age: 55
End: 2020-05-04
Attending: UROLOGY
Payer: COMMERCIAL

## 2020-05-04 ENCOUNTER — OFFICE VISIT (OUTPATIENT)
Dept: SURGERY | Facility: CLINIC | Age: 55
End: 2020-05-04
Payer: COMMERCIAL

## 2020-05-04 VITALS
BODY MASS INDEX: 29.69 KG/M2 | HEART RATE: 76 BPM | SYSTOLIC BLOOD PRESSURE: 117 MMHG | WEIGHT: 224 LBS | TEMPERATURE: 98 F | HEIGHT: 73 IN | DIASTOLIC BLOOD PRESSURE: 72 MMHG

## 2020-05-04 DIAGNOSIS — R35.1 NOCTURIA: ICD-10-CM

## 2020-05-04 DIAGNOSIS — Z12.5 PROSTATE CANCER SCREENING: ICD-10-CM

## 2020-05-04 DIAGNOSIS — N20.0 KIDNEY STONE: Primary | ICD-10-CM

## 2020-05-04 DIAGNOSIS — N17.9 ACUTE RENAL FAILURE, UNSPECIFIED ACUTE RENAL FAILURE TYPE (HCC): ICD-10-CM

## 2020-05-04 DIAGNOSIS — N13.39 OTHER HYDRONEPHROSIS: ICD-10-CM

## 2020-05-04 DIAGNOSIS — N20.0 KIDNEY STONE: ICD-10-CM

## 2020-05-04 PROCEDURE — 84550 ASSAY OF BLOOD/URIC ACID: CPT

## 2020-05-04 PROCEDURE — 82365 CALCULUS SPECTROSCOPY: CPT

## 2020-05-04 PROCEDURE — 99244 OFF/OP CNSLTJ NEW/EST MOD 40: CPT | Performed by: UROLOGY

## 2020-05-04 PROCEDURE — 36415 COLL VENOUS BLD VENIPUNCTURE: CPT

## 2020-05-04 PROCEDURE — 80069 RENAL FUNCTION PANEL: CPT

## 2020-05-04 PROCEDURE — 81003 URINALYSIS AUTO W/O SCOPE: CPT

## 2020-05-04 NOTE — PATIENT INSTRUCTIONS
Iglesia Brandt M.D.      1.  Continue yearly prostate cancer screening––blood draw for PSA with Dr. Marino Gray: Your next blood test would be due second half of November 2020 or soon thereafter.   Please try to remember no s back on oral liquids for 3 hours before bedtime,  so that you are not bothered by waking up at night to urinate.  Severely restricting calcium in the diet may actually make things worse and we do want you to take in some calcium in your diet, up to 1,200 mg

## 2020-05-04 NOTE — PROGRESS NOTES
Liliya Brunner is a 47year old male.     HPI:   Patient presents with:  Consult  Kidney Problem: Patient present tody c/o Kidney Stone, Patient states he passed the stone Friday night about 10:00pm , Patient denies pain or blood in the urine      History pr pyelogram x-ray, insertion of RIGHT ureteral stent, possible stone manipulation under MAC anesthesia or general anesthesia. \"   06/03/2017 Cystoscopy with RIGHT retrograde pyelogram x-ray, insertion of RIGHT ureteral stent; Postoperative diagnosis;  Right u • Heart Disease Father    • Diabetes Mother    • Breast Cancer Mother    • Hypertension Mother    • Melanoma Sister    • Glaucoma Neg       Social History: Social History    Tobacco Use      Smoking status: Never Smoker      Smokeless tobacco: Never Used discharge and vision loss  Hema/Lymph:  Negative for easy bleeding and easy bruising  Integumentary:  Negative for pruritus and rash  Musculoskeletal:  Negative for bone/joint symptoms  Psychiatric:  Negative for inappropriate interaction and psychiatric s LEFT ureteral calculus, 3 mm LEFT lower pole renal calculus and additional punctate LEFT lower pole renal calculus, RIGHT upper pole 1 mm renal calculus and a 3 mm RIGHT lower pole renal calculus; ABDOMINAL WALL, RIGHT inguinal hernia.                 ASSES patient recommended measures to help prevent kidney stone formation--please see instructions below for a summary of the discussion that took place. Patient will provide blood draw today for uric acid levels.      Left hydronephrosis  Documented 5/1/2020 CT food  Avoid drinking iced tea  If you are thirsty, try to drink either water or lemonade  Avoid eating peanuts or nuts or peanut butter often  Drink lots of liquids daytime every day to help prevent kidney stones    4. In 1 year.   Please submit urine spec vitamin D3 to take, getting blood levels of vitamin D once or twice a year may be the best way to monitor this, but check with your primary physician.     6.  If today's blood draw for uric acid comes back elevated, my intention would be to prescribe allopu

## 2020-07-27 ENCOUNTER — TELEPHONE (OUTPATIENT)
Dept: FAMILY MEDICINE CLINIC | Facility: CLINIC | Age: 55
End: 2020-07-27

## 2020-11-29 ENCOUNTER — HOSPITAL ENCOUNTER (OUTPATIENT)
Age: 55
Discharge: HOME | End: 2020-11-29
Payer: COMMERCIAL

## 2020-11-29 ENCOUNTER — APPOINTMENT (OUTPATIENT)
Dept: CT IMAGING | Age: 55
End: 2020-11-29
Attending: EMERGENCY MEDICINE
Payer: COMMERCIAL

## 2020-11-29 ENCOUNTER — APPOINTMENT (OUTPATIENT)
Dept: GENERAL RADIOLOGY | Age: 55
End: 2020-11-29
Attending: EMERGENCY MEDICINE
Payer: COMMERCIAL

## 2020-11-29 VITALS
OXYGEN SATURATION: 95 % | SYSTOLIC BLOOD PRESSURE: 135 MMHG | TEMPERATURE: 98 F | DIASTOLIC BLOOD PRESSURE: 85 MMHG | RESPIRATION RATE: 20 BRPM | HEART RATE: 114 BPM

## 2020-11-29 DIAGNOSIS — S29.9XXA TRAUMATIC INJURY OF RIB: ICD-10-CM

## 2020-11-29 DIAGNOSIS — S09.90XA INJURY OF HEAD, INITIAL ENCOUNTER: Primary | ICD-10-CM

## 2020-11-29 DIAGNOSIS — S63.502A SPRAIN OF LEFT WRIST, INITIAL ENCOUNTER: ICD-10-CM

## 2020-11-29 PROCEDURE — 71100 X-RAY EXAM RIBS UNI 2 VIEWS: CPT | Performed by: EMERGENCY MEDICINE

## 2020-11-29 PROCEDURE — 99214 OFFICE O/P EST MOD 30 MIN: CPT

## 2020-11-29 PROCEDURE — 71101 X-RAY EXAM UNILAT RIBS/CHEST: CPT | Performed by: EMERGENCY MEDICINE

## 2020-11-29 PROCEDURE — 70450 CT HEAD/BRAIN W/O DYE: CPT | Performed by: EMERGENCY MEDICINE

## 2020-11-29 RX ORDER — HYDROCODONE BITARTRATE AND ACETAMINOPHEN 10; 325 MG/1; MG/1
TABLET ORAL
Qty: 12 TABLET | Refills: 0 | Status: SHIPPED | OUTPATIENT
Start: 2020-11-29 | End: 2021-01-11

## 2020-11-29 RX ORDER — METHOCARBAMOL 500 MG/1
500 TABLET, FILM COATED ORAL 3 TIMES DAILY PRN
Qty: 14 TABLET | Refills: 0 | Status: SHIPPED | OUTPATIENT
Start: 2020-11-29 | End: 2021-01-11 | Stop reason: ALTCHOICE

## 2020-11-29 NOTE — ED PROVIDER NOTES
Patient Seen in: [facility] [place]      History   Patient presents with:  Fall    Stated Complaint: Jb [place] down stairs - hit head, unable to move left arm     Patricia Downs is a 54year old male, presenting after a fall that occurred last night around Social History    Tobacco Use      Smoking status: Never Smoker      Smokeless tobacco: Never Used    Alcohol use: Yes      Alcohol/week: 0.0 standard drinks      Comment: occ BEER    Drug use:  No             Review of Systems    Positive for stated compla Sensory: Sensation is intact. Motor: Motor function is intact. Psychiatric:         Mood and Affect: Mood normal.         Behavior: Behavior normal.         Thought Content:  Thought content normal.         Judgment: Judgment normal. I have given the patient instructions regarding their diagnoses, expectations, follow up, and ER precautions. I explained to the patient that emergent conditions may arise and to go to the ER for new, worsening or any persistent conditions.  I've explained

## 2020-11-29 NOTE — ED INITIAL ASSESSMENT (HPI)
S/p falling 7 concrete step stairs last night, + loc , here for left shoulder, l  rib ,  l elbow pain, laceration to left side of head, abrasion  to left elbow

## 2021-01-11 ENCOUNTER — OFFICE VISIT (OUTPATIENT)
Dept: FAMILY MEDICINE CLINIC | Facility: CLINIC | Age: 56
End: 2021-01-11
Payer: COMMERCIAL

## 2021-01-11 VITALS
HEART RATE: 80 BPM | BODY MASS INDEX: 29.03 KG/M2 | HEIGHT: 73 IN | DIASTOLIC BLOOD PRESSURE: 81 MMHG | WEIGHT: 219 LBS | SYSTOLIC BLOOD PRESSURE: 128 MMHG

## 2021-01-11 DIAGNOSIS — Z12.5 PROSTATE CANCER SCREENING: ICD-10-CM

## 2021-01-11 DIAGNOSIS — E11.9 DIABETES MELLITUS WITHOUT COMPLICATION (HCC): ICD-10-CM

## 2021-01-11 DIAGNOSIS — Z00.00 WELLNESS EXAMINATION: Primary | ICD-10-CM

## 2021-01-11 DIAGNOSIS — Z12.11 COLON CANCER SCREENING: ICD-10-CM

## 2021-01-11 DIAGNOSIS — Z23 NEED FOR 23-POLYVALENT PNEUMOCOCCAL POLYSACCHARIDE VACCINE: ICD-10-CM

## 2021-01-11 DIAGNOSIS — Z23 NEEDS FLU SHOT: ICD-10-CM

## 2021-01-11 DIAGNOSIS — Z13.220 LIPID SCREENING: ICD-10-CM

## 2021-01-11 PROCEDURE — 90472 IMMUNIZATION ADMIN EACH ADD: CPT | Performed by: FAMILY MEDICINE

## 2021-01-11 PROCEDURE — 90471 IMMUNIZATION ADMIN: CPT | Performed by: FAMILY MEDICINE

## 2021-01-11 PROCEDURE — 90732 PPSV23 VACC 2 YRS+ SUBQ/IM: CPT | Performed by: FAMILY MEDICINE

## 2021-01-11 PROCEDURE — 90686 IIV4 VACC NO PRSV 0.5 ML IM: CPT | Performed by: FAMILY MEDICINE

## 2021-01-11 PROCEDURE — 3008F BODY MASS INDEX DOCD: CPT | Performed by: FAMILY MEDICINE

## 2021-01-11 PROCEDURE — 3079F DIAST BP 80-89 MM HG: CPT | Performed by: FAMILY MEDICINE

## 2021-01-11 PROCEDURE — 99396 PREV VISIT EST AGE 40-64: CPT | Performed by: FAMILY MEDICINE

## 2021-01-11 PROCEDURE — 3074F SYST BP LT 130 MM HG: CPT | Performed by: FAMILY MEDICINE

## 2021-01-12 NOTE — PATIENT INSTRUCTIONS
Diet: Diabetes  Food is an important tool that you can use to control diabetes and stay healthy. Eating well-balanced meals in the correct amounts will help you control your blood glucose levels and prevent low blood sugar reactions.  It will also help yo · Talk with your healthcare provider if you drink alcohol. Alcohol can have unpredictable effects on blood glucose. It's also high in empty calories and can raise a type of blood fat called triglycerides. Drink water or calorie-free diet drinks instead.   · Carbohydrates (carbs) are the main source of fuel for the body. They raise blood sugar. Many people think carbohydrates are only in pasta or bread. But carbohydrates are in many kinds of foods. Carbs include:  · Sugars. These are naturally in foods such as · Saturated fats. These are found in animal products, such as meat, poultry, whole milk, lard, and butter. Saturated fats raise LDL cholesterol. They are not healthy for your heart. · Hydrogenated oilsand trans fats.  These are formed when vegetable oils are

## 2021-01-12 NOTE — PROGRESS NOTES
HPI:   Bubba Nice is a 54year old male who presents for an Annual Health Visit.    Patient reports since last visit he had a fracture of right ankle, he has been following up with Ortho, recently he also had a fall that resulted in overstretching Comment: occ BEER    Drug use: No    Social History    Social History Narrative      Not on file       REVIEW OF SYSTEMS:     Review of Systems   Constitutional: Negative. Respiratory: Negative. Cardiovascular: Negative.     Gastrointestinal: Nega Need for 23-polyvalent pneumococcal polysaccharide vaccine  -     PNEUMOCOCCAL IMM (PNEUMOVAX)    Diabetes mellitus without complication ([facility]  -     OPHTHALMOLOGY - INTERNAL      Labs per orders, follow-up with ophthalmology provided, will monitor respons · Limit carbohydrates at each meal to help manage your diabetes. The carbohydrates you eat become glucose in the blood.  Talk with your healthcare provider about how many grams of carbohydrates are recommended for you at each meal. Eat 3 meals a day, at con Food is a source of fuel and nourishment for your body. It’s also a source of pleasure. Having diabetes doesn’t mean you have to eat special foods or give up desserts. Instead, your dietitian can show you how to plan meals to suit your body.  To start, lear · Polyunsaturated fats. These are mostly found in vegetable oils, such as corn, safflower, and soybean oils. They are found in some seeds, nuts, and fish. Polyunsaturated fats lower LDL (unhealthy) cholesterol.  So, choosing them instead of saturated fats is Hepatic steatosis     Excessive drinking alcohol     Kidney stone     CHAO (acute kidney injury) ([address]     Recurrent nephrolithiasis     High serum testosterone     Diabetes mellitus without complication (HCC)     Age-related nuclear cataract of both eye

## 2021-01-30 ENCOUNTER — LAB ENCOUNTER (OUTPATIENT)
Dept: LAB | Age: 56
End: 2021-01-30
Attending: FAMILY MEDICINE
Payer: COMMERCIAL

## 2021-01-30 DIAGNOSIS — Z13.220 LIPID SCREENING: ICD-10-CM

## 2021-01-30 DIAGNOSIS — Z00.00 WELLNESS EXAMINATION: ICD-10-CM

## 2021-01-30 DIAGNOSIS — Z12.5 PROSTATE CANCER SCREENING: ICD-10-CM

## 2021-01-30 LAB
ALBUMIN SERPL-MCNC: 3.5 G/DL (ref 3.4–5)
ALBUMIN/GLOB SERPL: 1.1 {RATIO} (ref 1–2)
ALP LIVER SERPL-CCNC: 128 U/L
ALT SERPL-CCNC: 48 U/L
ANION GAP SERPL CALC-SCNC: 5 MMOL/L (ref 0–18)
AST SERPL-CCNC: 20 U/L (ref 15–37)
BILIRUB SERPL-MCNC: 0.7 MG/DL (ref 0.1–2)
BUN BLD-MCNC: 14 MG/DL (ref 7–18)
BUN/CREAT SERPL: 11.4 (ref 10–20)
CALCIUM BLD-MCNC: 8.7 MG/DL (ref 8.5–10.1)
CHLORIDE SERPL-SCNC: 108 MMOL/L (ref 98–112)
CHOLEST SMN-MCNC: 139 MG/DL (ref ?–200)
CO2 SERPL-SCNC: 28 MMOL/L (ref 21–32)
COMPLEXED PSA SERPL-MCNC: 1.19 NG/ML (ref ?–4)
CREAT BLD-MCNC: 1.23 MG/DL
CREAT UR-SCNC: 200 MG/DL
EST. AVERAGE GLUCOSE BLD GHB EST-MCNC: 151 MG/DL (ref 68–126)
GLOBULIN PLAS-MCNC: 3.1 G/DL (ref 2.8–4.4)
GLUCOSE BLD-MCNC: 190 MG/DL (ref 70–99)
HBA1C MFR BLD HPLC: 6.9 % (ref ?–5.7)
HDLC SERPL-MCNC: 38 MG/DL (ref 40–59)
LDLC SERPL CALC-MCNC: 72 MG/DL (ref ?–100)
M PROTEIN MFR SERPL ELPH: 6.6 G/DL (ref 6.4–8.2)
MICROALBUMIN UR-MCNC: 1.31 MG/DL
MICROALBUMIN/CREAT 24H UR-RTO: 6.6 UG/MG (ref ?–30)
NONHDLC SERPL-MCNC: 101 MG/DL (ref ?–130)
OSMOLALITY SERPL CALC.SUM OF ELEC: 298 MOSM/KG (ref 275–295)
POTASSIUM SERPL-SCNC: 4.2 MMOL/L (ref 3.5–5.1)
SODIUM SERPL-SCNC: 141 MMOL/L (ref 136–145)
TRIGL SERPL-MCNC: 147 MG/DL (ref 30–149)
VLDLC SERPL CALC-MCNC: 29 MG/DL (ref 0–30)

## 2021-01-30 PROCEDURE — 36415 COLL VENOUS BLD VENIPUNCTURE: CPT

## 2021-01-30 PROCEDURE — 3061F NEG MICROALBUMINURIA REV: CPT | Performed by: FAMILY MEDICINE

## 2021-01-30 PROCEDURE — 80061 LIPID PANEL: CPT

## 2021-01-30 PROCEDURE — 82043 UR ALBUMIN QUANTITATIVE: CPT

## 2021-01-30 PROCEDURE — 80053 COMPREHEN METABOLIC PANEL: CPT

## 2021-01-30 PROCEDURE — 83036 HEMOGLOBIN GLYCOSYLATED A1C: CPT

## 2021-01-30 PROCEDURE — 82570 ASSAY OF URINE CREATININE: CPT

## 2021-01-30 PROCEDURE — 3044F HG A1C LEVEL LT 7.0%: CPT | Performed by: FAMILY MEDICINE

## 2021-03-25 DIAGNOSIS — E78.00 HIGH CHOLESTEROL: ICD-10-CM

## 2021-03-25 DIAGNOSIS — E11.9 DIABETES MELLITUS WITHOUT COMPLICATION (HCC): ICD-10-CM

## 2021-03-25 RX ORDER — ROSUVASTATIN CALCIUM 10 MG/1
10 TABLET, COATED ORAL NIGHTLY
Qty: 90 TABLET | Refills: 3 | Status: SHIPPED | OUTPATIENT
Start: 2021-03-25

## 2021-03-25 NOTE — TELEPHONE ENCOUNTER
Current Outpatient Medications:     •  Rosuvastatin Calcium 10 MG Oral Tab, Take 1 tablet (10 mg total) by mouth nightly., Disp: 90 tablet, Rfl: 3

## 2021-03-31 DIAGNOSIS — E11.9 DIABETES MELLITUS WITHOUT COMPLICATION (HCC): ICD-10-CM

## 2021-03-31 RX ORDER — LISINOPRIL 10 MG/1
10 TABLET ORAL DAILY
Qty: 90 TABLET | Refills: 1 | Status: SHIPPED | OUTPATIENT
Start: 2021-03-31 | End: 2021-08-10

## 2021-03-31 NOTE — TELEPHONE ENCOUNTER
Current Outpatient Medications:     •  lisinopril 10 MG Oral Tab, Take 1 tablet (10 mg total) by mouth daily. , Disp: 90 tablet, Rfl: 0

## 2021-04-11 DIAGNOSIS — Z23 NEED FOR VACCINATION: ICD-10-CM

## 2021-08-10 ENCOUNTER — OFFICE VISIT (OUTPATIENT)
Dept: FAMILY MEDICINE CLINIC | Facility: CLINIC | Age: 56
End: 2021-08-10
Payer: COMMERCIAL

## 2021-08-10 VITALS
BODY MASS INDEX: 28.89 KG/M2 | DIASTOLIC BLOOD PRESSURE: 76 MMHG | SYSTOLIC BLOOD PRESSURE: 113 MMHG | RESPIRATION RATE: 18 BRPM | WEIGHT: 218 LBS | HEART RATE: 85 BPM | HEIGHT: 73 IN

## 2021-08-10 DIAGNOSIS — E78.00 HIGH CHOLESTEROL: ICD-10-CM

## 2021-08-10 DIAGNOSIS — E11.65 UNCONTROLLED TYPE 2 DIABETES MELLITUS WITH HYPERGLYCEMIA (HCC): Primary | ICD-10-CM

## 2021-08-10 DIAGNOSIS — Z72.89 ALCOHOL INTAKE ABOVE RECOMMENDED SENSIBLE LIMITS: ICD-10-CM

## 2021-08-10 LAB
CARTRIDGE LOT#: 788 NUMERIC
HEMOGLOBIN A1C: 9.3 % (ref 4.3–5.6)

## 2021-08-10 PROCEDURE — 3074F SYST BP LT 130 MM HG: CPT | Performed by: FAMILY MEDICINE

## 2021-08-10 PROCEDURE — 99214 OFFICE O/P EST MOD 30 MIN: CPT | Performed by: FAMILY MEDICINE

## 2021-08-10 PROCEDURE — 83036 HEMOGLOBIN GLYCOSYLATED A1C: CPT | Performed by: FAMILY MEDICINE

## 2021-08-10 PROCEDURE — 3046F HEMOGLOBIN A1C LEVEL >9.0%: CPT | Performed by: FAMILY MEDICINE

## 2021-08-10 PROCEDURE — 3008F BODY MASS INDEX DOCD: CPT | Performed by: FAMILY MEDICINE

## 2021-08-10 PROCEDURE — 3078F DIAST BP <80 MM HG: CPT | Performed by: FAMILY MEDICINE

## 2021-08-10 RX ORDER — SEMAGLUTIDE 1.34 MG/ML
0.25 INJECTION, SOLUTION SUBCUTANEOUS
Qty: 1.5 ML | Refills: 0 | Status: SHIPPED | OUTPATIENT
Start: 2021-08-10 | End: 2021-09-07

## 2021-08-10 NOTE — PATIENT INSTRUCTIONS
Behavioral Health Assessment and Treatment    SETTING UP A BEHAVIORAL HEALTH ASSESSMENT    To schedule an assessment at any of our below locations call:   [phone]. Emergency walk-in assessments are available 24/7 at our [facility].

## 2021-08-10 NOTE — PROGRESS NOTES
Pablo Jeans is a 64year old male.   HPI:   Patient is here to follow-up after he had recent physical for [employer] as part of the removal of his license, patient reports that his glucose was over 200, he also reports that before he completed testing he had had Skin: Negative. Neurological: Negative. EXAM:   /76   Pulse 85   Resp 18   Ht 6' 1\" (1.854 m)   Wt 218 lb (98.9 kg)   BMI 28.76 kg/m²     Physical Exam  Constitutional:       General: He is not in acute distress.   Pulmonary:      Effo affecting his glucose, he is states that he knows he can stop cold turkey but he has been very stressed, provided information for Pablo Ortiz       The patient indicates understanding of these issues and agrees to the plan.   The patient is asked to return

## 2021-09-22 ENCOUNTER — TELEPHONE (OUTPATIENT)
Dept: FAMILY MEDICINE CLINIC | Facility: CLINIC | Age: 56
End: 2021-09-22

## 2021-09-22 PROBLEM — N18.2 CKD (CHRONIC KIDNEY DISEASE) STAGE 2, GFR 60-89 ML/MIN: Status: ACTIVE | Noted: 2021-09-22

## 2021-09-22 NOTE — TELEPHONE ENCOUNTER
Contacted [facility], spoke with Courtenay Boast, pharmacist.    Informed of message below. No further questions.

## 2021-09-22 NOTE — TELEPHONE ENCOUNTER
Per pharmacy, patient chart shows he has renal failure. Please advise if this information is correct and patient may still be prescribed metformin.  Thank you

## 2021-09-22 NOTE — TELEPHONE ENCOUNTER
Patient does not have renal failure, he has CKD stage 2, this is not a contraindication for metformin.

## 2022-02-19 ENCOUNTER — LAB ENCOUNTER (OUTPATIENT)
Dept: LAB | Age: 57
End: 2022-02-19
Attending: FAMILY MEDICINE
Payer: COMMERCIAL

## 2022-02-19 DIAGNOSIS — Z00.00 WELLNESS EXAMINATION: ICD-10-CM

## 2022-02-19 DIAGNOSIS — E11.9 DIABETES MELLITUS WITHOUT COMPLICATION (HCC): ICD-10-CM

## 2022-02-19 DIAGNOSIS — Z13.0 SCREENING FOR DEFICIENCY ANEMIA: ICD-10-CM

## 2022-02-19 LAB
ALBUMIN SERPL-MCNC: 3.4 G/DL (ref 3.4–5)
ALBUMIN/GLOB SERPL: 1.2 {RATIO} (ref 1–2)
ALP LIVER SERPL-CCNC: 61 U/L
ALT SERPL-CCNC: 72 U/L
ANION GAP SERPL CALC-SCNC: 3 MMOL/L (ref 0–18)
AST SERPL-CCNC: 29 U/L (ref 15–37)
BASOPHILS # BLD AUTO: 0.06 X10(3) UL (ref 0–0.2)
BASOPHILS NFR BLD AUTO: 0.8 %
BILIRUB SERPL-MCNC: 0.7 MG/DL (ref 0.1–2)
BUN BLD-MCNC: 15 MG/DL (ref 7–18)
BUN/CREAT SERPL: 12.8 (ref 10–20)
CALCIUM BLD-MCNC: 8.8 MG/DL (ref 8.5–10.1)
CHLORIDE SERPL-SCNC: 105 MMOL/L (ref 98–112)
CHOLEST SERPL-MCNC: 187 MG/DL (ref ?–200)
CO2 SERPL-SCNC: 30 MMOL/L (ref 21–32)
CREAT BLD-MCNC: 1.17 MG/DL
CREAT UR-SCNC: 247 MG/DL
DEPRECATED RDW RBC AUTO: 43 FL (ref 35.1–46.3)
EOSINOPHIL # BLD AUTO: 0.09 X10(3) UL (ref 0–0.7)
EOSINOPHIL NFR BLD AUTO: 1.2 %
ERYTHROCYTE [DISTWIDTH] IN BLOOD BY AUTOMATED COUNT: 12.2 % (ref 11–15)
EST. AVERAGE GLUCOSE BLD GHB EST-MCNC: 160 MG/DL (ref 68–126)
FASTING PATIENT LIPID ANSWER: YES
FASTING STATUS PATIENT QL REPORTED: YES
GLOBULIN PLAS-MCNC: 2.8 G/DL (ref 2.8–4.4)
GLUCOSE BLD-MCNC: 148 MG/DL (ref 70–99)
HBA1C MFR BLD: 7.2 % (ref ?–5.7)
HCT VFR BLD AUTO: 58.6 %
HDLC SERPL-MCNC: 32 MG/DL (ref 40–59)
HGB BLD-MCNC: 19.3 G/DL
IMM GRANULOCYTES # BLD AUTO: 0.03 X10(3) UL (ref 0–1)
IMM GRANULOCYTES NFR BLD: 0.4 %
LDLC SERPL CALC-MCNC: 125 MG/DL (ref ?–100)
LYMPHOCYTES # BLD AUTO: 3.03 X10(3) UL (ref 1–4)
LYMPHOCYTES NFR BLD AUTO: 40.3 %
MCH RBC QN AUTO: 31.4 PG (ref 26–34)
MCHC RBC AUTO-ENTMCNC: 32.9 G/DL (ref 31–37)
MCV RBC AUTO: 95.3 FL
MICROALBUMIN UR-MCNC: 1.17 MG/DL
MICROALBUMIN/CREAT 24H UR-RTO: 4.7 UG/MG (ref ?–30)
MONOCYTES # BLD AUTO: 0.76 X10(3) UL (ref 0.1–1)
MONOCYTES NFR BLD AUTO: 10.1 %
NEUTROPHILS # BLD AUTO: 3.54 X10 (3) UL (ref 1.5–7.7)
NEUTROPHILS # BLD AUTO: 3.54 X10(3) UL (ref 1.5–7.7)
NEUTROPHILS NFR BLD AUTO: 47.2 %
NONHDLC SERPL-MCNC: 155 MG/DL (ref ?–130)
OSMOLALITY SERPL CALC.SUM OF ELEC: 290 MOSM/KG (ref 275–295)
PLATELET # BLD AUTO: 191 10(3)UL (ref 150–450)
POTASSIUM SERPL-SCNC: 4.5 MMOL/L (ref 3.5–5.1)
PROT SERPL-MCNC: 6.2 G/DL (ref 6.4–8.2)
RBC # BLD AUTO: 6.15 X10(6)UL
SODIUM SERPL-SCNC: 138 MMOL/L (ref 136–145)
TRIGL SERPL-MCNC: 167 MG/DL (ref 30–149)
VLDLC SERPL CALC-MCNC: 30 MG/DL (ref 0–30)
WBC # BLD AUTO: 7.5 X10(3) UL (ref 4–11)

## 2022-02-19 PROCEDURE — 80053 COMPREHEN METABOLIC PANEL: CPT

## 2022-02-19 PROCEDURE — 80061 LIPID PANEL: CPT

## 2022-02-19 PROCEDURE — 85025 COMPLETE CBC W/AUTO DIFF WBC: CPT

## 2022-02-19 PROCEDURE — 83036 HEMOGLOBIN GLYCOSYLATED A1C: CPT

## 2022-02-19 PROCEDURE — 3061F NEG MICROALBUMINURIA REV: CPT | Performed by: FAMILY MEDICINE

## 2022-02-19 PROCEDURE — 82570 ASSAY OF URINE CREATININE: CPT

## 2022-02-19 PROCEDURE — 3051F HG A1C>EQUAL 7.0%<8.0%: CPT | Performed by: FAMILY MEDICINE

## 2022-02-19 PROCEDURE — 85060 BLOOD SMEAR INTERPRETATION: CPT

## 2022-02-19 PROCEDURE — 82043 UR ALBUMIN QUANTITATIVE: CPT

## 2022-02-19 PROCEDURE — 36415 COLL VENOUS BLD VENIPUNCTURE: CPT

## 2022-02-21 RX ORDER — ROSUVASTATIN CALCIUM 10 MG/1
10 TABLET, COATED ORAL NIGHTLY
Qty: 90 TABLET | Refills: 3 | Status: SHIPPED | OUTPATIENT
Start: 2022-02-21

## 2022-03-21 ENCOUNTER — OFFICE VISIT (OUTPATIENT)
Dept: FAMILY MEDICINE CLINIC | Facility: CLINIC | Age: 57
End: 2022-03-21
Payer: COMMERCIAL

## 2022-03-21 VITALS
RESPIRATION RATE: 17 BRPM | HEART RATE: 87 BPM | WEIGHT: 204.19 LBS | DIASTOLIC BLOOD PRESSURE: 63 MMHG | HEIGHT: 73 IN | SYSTOLIC BLOOD PRESSURE: 104 MMHG | BODY MASS INDEX: 27.06 KG/M2

## 2022-03-21 DIAGNOSIS — N18.2 CKD (CHRONIC KIDNEY DISEASE) STAGE 2, GFR 60-89 ML/MIN: ICD-10-CM

## 2022-03-21 DIAGNOSIS — E78.00 HIGH CHOLESTEROL: Primary | ICD-10-CM

## 2022-03-21 DIAGNOSIS — E11.22 TYPE 2 DIABETES MELLITUS WITH STAGE 2 CHRONIC KIDNEY DISEASE, WITHOUT LONG-TERM CURRENT USE OF INSULIN (HCC): ICD-10-CM

## 2022-03-21 DIAGNOSIS — N18.2 TYPE 2 DIABETES MELLITUS WITH STAGE 2 CHRONIC KIDNEY DISEASE, WITHOUT LONG-TERM CURRENT USE OF INSULIN (HCC): ICD-10-CM

## 2022-03-21 DIAGNOSIS — Z12.11 COLON CANCER SCREENING: ICD-10-CM

## 2022-03-21 DIAGNOSIS — F33.41 RECURRENT MAJOR DEPRESSIVE DISORDER, IN PARTIAL REMISSION (HCC): ICD-10-CM

## 2022-03-21 PROCEDURE — 3074F SYST BP LT 130 MM HG: CPT | Performed by: FAMILY MEDICINE

## 2022-03-21 PROCEDURE — 3008F BODY MASS INDEX DOCD: CPT | Performed by: FAMILY MEDICINE

## 2022-03-21 PROCEDURE — 99213 OFFICE O/P EST LOW 20 MIN: CPT | Performed by: FAMILY MEDICINE

## 2022-03-21 PROCEDURE — 3078F DIAST BP <80 MM HG: CPT | Performed by: FAMILY MEDICINE

## 2022-03-26 ENCOUNTER — LAB ENCOUNTER (OUTPATIENT)
Dept: LAB | Age: 57
End: 2022-03-26
Attending: FAMILY MEDICINE
Payer: COMMERCIAL

## 2022-03-26 DIAGNOSIS — E78.00 HIGH CHOLESTEROL: ICD-10-CM

## 2022-03-26 LAB
ALBUMIN SERPL-MCNC: 3.5 G/DL (ref 3.4–5)
ALP LIVER SERPL-CCNC: 79 U/L
ALT SERPL-CCNC: 56 U/L
AST SERPL-CCNC: 28 U/L (ref 15–37)
BILIRUB DIRECT SERPL-MCNC: 0.2 MG/DL (ref 0–0.2)
CHOLEST SERPL-MCNC: 131 MG/DL (ref ?–200)
FASTING PATIENT LIPID ANSWER: YES
HDLC SERPL-MCNC: 41 MG/DL (ref 40–59)
LDLC SERPL CALC-MCNC: 69 MG/DL (ref ?–100)
NONHDLC SERPL-MCNC: 90 MG/DL (ref ?–130)
PROT SERPL-MCNC: 6.2 G/DL (ref 6.4–8.2)
TRIGL SERPL-MCNC: 114 MG/DL (ref 30–149)
VLDLC SERPL CALC-MCNC: 17 MG/DL (ref 0–30)

## 2022-03-26 PROCEDURE — 80076 HEPATIC FUNCTION PANEL: CPT

## 2022-03-26 PROCEDURE — 80061 LIPID PANEL: CPT

## 2022-03-26 PROCEDURE — 36415 COLL VENOUS BLD VENIPUNCTURE: CPT

## 2022-05-03 ENCOUNTER — TELEPHONE (OUTPATIENT)
Dept: FAMILY MEDICINE CLINIC | Facility: CLINIC | Age: 57
End: 2022-05-03

## 2022-05-03 NOTE — TELEPHONE ENCOUNTER
cologuard order was not received on time and sample could not be processed.  states has already received a replacement from [employer].   Will be repeating the test.

## 2022-06-21 ENCOUNTER — HOSPITAL ENCOUNTER (OUTPATIENT)
Dept: CT IMAGING | Facility: HOSPITAL | Age: 57
Discharge: HOME | End: 2022-06-21
Attending: FAMILY MEDICINE

## 2022-06-21 DIAGNOSIS — Z13.6 SCREENING FOR CARDIOVASCULAR CONDITION: ICD-10-CM

## 2022-10-19 DIAGNOSIS — E11.9 DIABETES MELLITUS WITHOUT COMPLICATION (HCC): ICD-10-CM

## 2022-10-19 DIAGNOSIS — E78.00 HIGH CHOLESTEROL: ICD-10-CM

## 2022-10-19 RX ORDER — ROSUVASTATIN CALCIUM 10 MG/1
10 TABLET, COATED ORAL NIGHTLY
Qty: 90 TABLET | Refills: 3 | OUTPATIENT
Start: 2022-10-19

## 2022-10-20 DIAGNOSIS — E78.00 HIGH CHOLESTEROL: ICD-10-CM

## 2022-10-20 DIAGNOSIS — E11.22 TYPE 2 DIABETES MELLITUS WITH STAGE 2 CHRONIC KIDNEY DISEASE, WITHOUT LONG-TERM CURRENT USE OF INSULIN (HCC): ICD-10-CM

## 2022-10-20 DIAGNOSIS — N18.2 TYPE 2 DIABETES MELLITUS WITH STAGE 2 CHRONIC KIDNEY DISEASE, WITHOUT LONG-TERM CURRENT USE OF INSULIN (HCC): ICD-10-CM

## 2022-10-20 DIAGNOSIS — E11.9 DIABETES MELLITUS WITHOUT COMPLICATION (HCC): ICD-10-CM

## 2022-10-20 RX ORDER — ROSUVASTATIN CALCIUM 10 MG/1
10 TABLET, COATED ORAL NIGHTLY
Qty: 90 TABLET | Refills: 1 | Status: SHIPPED | OUTPATIENT
Start: 2022-10-20

## 2022-10-20 NOTE — TELEPHONE ENCOUNTER
Please review. Protocol failed. No future appointments. Requested Prescriptions   Pending Prescriptions Disp Refills    metFORMIN 500 MG Oral Tab 360 tablet 0     Sig: Take 2 tablets (1,000 mg total) by mouth 2 (two) times daily with meals. Diabetes Medication Protocol Failed - 10/20/2022  2:37 PM        Failed - Last A1C < 7.5 and within past 6 months     Lab Results   Component Value Date    A1C 7.2 (H) 02/19/2022               Failed - In person appointment or virtual visit in the past 6 mos or appointment in next 3 mos       Recent Outpatient Visits              7 months ago High cholesterol    [address] Elisa Curry MD    Office Visit    8 months ago Recurrent major depressive disorder, in partial remission [facility])    [address] [facility] Nelson Maldonado MD    Office Visit    1 year ago Uncontrolled type 2 diabetes mellitus with hyperglycemia [facility])    [address] Family Medicine Nelson Maldonado MD    Office Visit    1 year ago Wellness examination    [address] [facility] Snowflake MD Erica    Office Visit    2 years ago Kidney stone    [facility] Slime Todd [facility]    Office Visit                 Passed - [facility] or [facility] > 50     GFR Evaluation  GFRNAA: 69 , resulted on 2/19/2022            Passed - GFR in the past 12 months           rosuvastatin 10 MG Oral Tab 90 tablet 3     Sig: Take 1 tablet (10 mg total) by mouth nightly.         Cholesterol Medication Protocol Passed - 10/20/2022  2:37 PM        Passed - ALT in past 12 months        Passed - LDL in past 12 months        Passed - Last ALT < 80       Lab Results   Component Value Date    ALT 56 03/26/2022             Passed - Last LDL < 130     Lab Results   Component Value Date    LDL 69 03/26/2022               Passed - In person appointment or virtual visit in the past 12 mos or appointment in next 3 mos       Recent Outpatient Visits              7 months ago High cholesterol    [address] Marc Lares MD    Office Visit    8 months ago Recurrent major depressive disorder, in partial remission [facility])    [address] Marc Lares MD    Office Visit    1 year ago Uncontrolled type 2 diabetes mellitus with hyperglycemia [facility])    [address] Marc Lares MD    Office Visit    1 year ago Wellness examination    [address] Marc Lares MD    Office Visit    2 years ago [facility] stone    [facility] Oralia Hoots, MD    Office Visit                       Recent Outpatient Visits              7 months ago High cholesterol    [address] Marc Lares MD    Office Visit    8 months ago Recurrent major depressive disorder, in partial remission [facility])    [address] Marc Lares MD    Office Visit    1 year ago Uncontrolled type 2 diabetes mellitus with hyperglycemia [facility])    [address] Family Medicine Whit Lares MD    Office Visit    1 year ago Wellness examination    [address] Marc Lares MD    Office Visit    2 years ago Kidney stone    [facility] for [facility], Lance Wise MD    Office Visit

## 2023-01-26 NOTE — TELEPHONE ENCOUNTER
Patient informed, verbalized understanding. Stated he will schedule via [employer]. Full name and  verified.

## 2023-01-27 DIAGNOSIS — E11.9 DIABETES MELLITUS WITHOUT COMPLICATION (HCC): ICD-10-CM

## 2023-01-27 DIAGNOSIS — E78.00 HIGH CHOLESTEROL: ICD-10-CM

## 2023-01-27 RX ORDER — ROSUVASTATIN CALCIUM 10 MG/1
10 TABLET, COATED ORAL NIGHTLY
Qty: 90 TABLET | Refills: 1 | Status: SHIPPED | OUTPATIENT
Start: 2023-01-27

## 2023-01-28 NOTE — TELEPHONE ENCOUNTER
Refill passed per [facility] protocol   Requested Prescriptions   Pending Prescriptions Disp Refills    rosuvastatin 10 MG Oral Tab 90 tablet 1     Sig: Take 1 tablet (10 mg total) by mouth nightly.        Cholesterol Medication Protocol Passed - 1/27/2023  6:37 PM        Passed - ALT in past 12 months        Passed - LDL in past 12 months        Passed - Last ALT < 80     Lab Results   Component Value Date    ALT 56 03/26/2022             Passed - Last LDL < 130     Lab Results   Component Value Date    LDL 69 03/26/2022             Passed - In person appointment or virtual visit in the past 12 mos or appointment in next 3 mos     Recent Outpatient Visits              10 months ago High cholesterol    [address], [address] [place] [address], [place] Arnetha Cash, MD    Office Visit    11 months ago Recurrent major depressive disorder, in partial remission [facility])    [address], [facility], [place] Arnetha Cash, MD    Office Visit    1 year ago Uncontrolled type 2 diabetes mellitus with hyperglycemia ([facility]    [address], [facility], [place] Arnetha Cash, MD    Office Visit    2 years ago Wellness examination    [address] [facility], [place] Arnetha Cash, MD    Office Visit    2 years ago Kidney stone    [address] [facility], Monique Landin MD    Office Visit          Future Appointments         Provider Department Appt Notes    In 3 weeks Jayna Alba MD [address], [facility] [address], [place] Physical, last px: 02/14/22

## 2023-02-20 ENCOUNTER — OFFICE VISIT (OUTPATIENT)
Dept: FAMILY MEDICINE CLINIC | Facility: CLINIC | Age: 58
End: 2023-02-20

## 2023-02-20 ENCOUNTER — LAB ENCOUNTER (OUTPATIENT)
Dept: LAB | Age: 58
End: 2023-02-20
Attending: FAMILY MEDICINE
Payer: COMMERCIAL

## 2023-02-20 VITALS
BODY MASS INDEX: 27.52 KG/M2 | HEART RATE: 81 BPM | DIASTOLIC BLOOD PRESSURE: 73 MMHG | RESPIRATION RATE: 17 BRPM | SYSTOLIC BLOOD PRESSURE: 118 MMHG | HEIGHT: 73 IN | WEIGHT: 207.63 LBS

## 2023-02-20 DIAGNOSIS — Z23 NEED FOR TDAP VACCINATION: ICD-10-CM

## 2023-02-20 DIAGNOSIS — Z00.00 WELLNESS EXAMINATION: ICD-10-CM

## 2023-02-20 DIAGNOSIS — Z13.0 SCREENING FOR DEFICIENCY ANEMIA: ICD-10-CM

## 2023-02-20 DIAGNOSIS — E78.00 HIGH CHOLESTEROL: ICD-10-CM

## 2023-02-20 DIAGNOSIS — E11.9 DIABETES MELLITUS WITHOUT COMPLICATION (HCC): ICD-10-CM

## 2023-02-20 DIAGNOSIS — Z12.11 COLON CANCER SCREENING: ICD-10-CM

## 2023-02-20 DIAGNOSIS — Z13.21 ENCOUNTER FOR VITAMIN DEFICIENCY SCREENING: ICD-10-CM

## 2023-02-20 DIAGNOSIS — Z00.00 WELLNESS EXAMINATION: Primary | ICD-10-CM

## 2023-02-20 DIAGNOSIS — Z12.5 PROSTATE CANCER SCREENING: ICD-10-CM

## 2023-02-20 DIAGNOSIS — Z13.29 THYROID DISORDER SCREEN: ICD-10-CM

## 2023-02-20 LAB
ALBUMIN SERPL-MCNC: 3.2 G/DL (ref 3.4–5)
ALBUMIN/GLOB SERPL: 1.2 {RATIO} (ref 1–2)
ALP LIVER SERPL-CCNC: 90 U/L
ALT SERPL-CCNC: 52 U/L
ANION GAP SERPL CALC-SCNC: 7 MMOL/L (ref 0–18)
AST SERPL-CCNC: 23 U/L (ref 15–37)
BASOPHILS # BLD AUTO: 0.05 X10(3) UL (ref 0–0.2)
BASOPHILS NFR BLD AUTO: 0.7 %
BILIRUB SERPL-MCNC: 0.6 MG/DL (ref 0.1–2)
BUN BLD-MCNC: 17 MG/DL (ref 7–18)
BUN/CREAT SERPL: 13 (ref 10–20)
CALCIUM BLD-MCNC: 8.8 MG/DL (ref 8.5–10.1)
CHLORIDE SERPL-SCNC: 104 MMOL/L (ref 98–112)
CHOLEST SERPL-MCNC: 177 MG/DL (ref ?–200)
CO2 SERPL-SCNC: 28 MMOL/L (ref 21–32)
COMPLEXED PSA SERPL-MCNC: 1.03 NG/ML (ref ?–4)
CREAT BLD-MCNC: 1.31 MG/DL
CREAT UR-SCNC: 48.2 MG/DL
DEPRECATED RDW RBC AUTO: 38.9 FL (ref 35.1–46.3)
EOSINOPHIL # BLD AUTO: 0.07 X10(3) UL (ref 0–0.7)
EOSINOPHIL NFR BLD AUTO: 0.9 %
ERYTHROCYTE [DISTWIDTH] IN BLOOD BY AUTOMATED COUNT: 11.7 % (ref 11–15)
EST. AVERAGE GLUCOSE BLD GHB EST-MCNC: 255 MG/DL (ref 68–126)
FASTING PATIENT LIPID ANSWER: YES
FASTING STATUS PATIENT QL REPORTED: YES
GFR SERPLBLD BASED ON 1.73 SQ M-ARVRAT: 63 ML/MIN/1.73M2 (ref 60–?)
GLOBULIN PLAS-MCNC: 2.7 G/DL (ref 2.8–4.4)
GLUCOSE BLD-MCNC: 369 MG/DL (ref 70–99)
HBA1C MFR BLD: 10.5 % (ref ?–5.7)
HCT VFR BLD AUTO: 47 %
HDLC SERPL-MCNC: 33 MG/DL (ref 40–59)
HGB BLD-MCNC: 16 G/DL
IMM GRANULOCYTES # BLD AUTO: 0.03 X10(3) UL (ref 0–1)
IMM GRANULOCYTES NFR BLD: 0.4 %
LDLC SERPL CALC-MCNC: 82 MG/DL (ref ?–100)
LYMPHOCYTES # BLD AUTO: 2.5 X10(3) UL (ref 1–4)
LYMPHOCYTES NFR BLD AUTO: 33.5 %
MCH RBC QN AUTO: 31.1 PG (ref 26–34)
MCHC RBC AUTO-ENTMCNC: 34 G/DL (ref 31–37)
MCV RBC AUTO: 91.3 FL
MICROALBUMIN UR-MCNC: <0.5 MG/DL
MONOCYTES # BLD AUTO: 0.61 X10(3) UL (ref 0.1–1)
MONOCYTES NFR BLD AUTO: 8.2 %
NEUTROPHILS # BLD AUTO: 4.21 X10 (3) UL (ref 1.5–7.7)
NEUTROPHILS # BLD AUTO: 4.21 X10(3) UL (ref 1.5–7.7)
NEUTROPHILS NFR BLD AUTO: 56.3 %
NONHDLC SERPL-MCNC: 144 MG/DL (ref ?–130)
OSMOLALITY SERPL CALC.SUM OF ELEC: 305 MOSM/KG (ref 275–295)
PLATELET # BLD AUTO: 180 10(3)UL (ref 150–450)
POTASSIUM SERPL-SCNC: 4.3 MMOL/L (ref 3.5–5.1)
PROT SERPL-MCNC: 5.9 G/DL (ref 6.4–8.2)
RBC # BLD AUTO: 5.15 X10(6)UL
SODIUM SERPL-SCNC: 139 MMOL/L (ref 136–145)
TRIGL SERPL-MCNC: 381 MG/DL (ref 30–149)
TSI SER-ACNC: 0.93 MIU/ML (ref 0.36–3.74)
VIT D+METAB SERPL-MCNC: 11.6 NG/ML (ref 30–100)
VLDLC SERPL CALC-MCNC: 61 MG/DL (ref 0–30)
WBC # BLD AUTO: 7.5 X10(3) UL (ref 4–11)

## 2023-02-20 PROCEDURE — 83036 HEMOGLOBIN GLYCOSYLATED A1C: CPT

## 2023-02-20 PROCEDURE — 82570 ASSAY OF URINE CREATININE: CPT

## 2023-02-20 PROCEDURE — 82043 UR ALBUMIN QUANTITATIVE: CPT

## 2023-02-20 PROCEDURE — 36415 COLL VENOUS BLD VENIPUNCTURE: CPT

## 2023-02-20 PROCEDURE — 84443 ASSAY THYROID STIM HORMONE: CPT

## 2023-02-20 PROCEDURE — 85025 COMPLETE CBC W/AUTO DIFF WBC: CPT

## 2023-02-20 PROCEDURE — 82306 VITAMIN D 25 HYDROXY: CPT

## 2023-02-20 PROCEDURE — 80061 LIPID PANEL: CPT

## 2023-02-20 PROCEDURE — 80053 COMPREHEN METABOLIC PANEL: CPT

## 2023-02-20 RX ORDER — METFORMIN HYDROCHLORIDE 500 MG/1
1000 TABLET, EXTENDED RELEASE ORAL 2 TIMES DAILY WITH MEALS
Qty: 360 TABLET | Refills: 0 | Status: SHIPPED | OUTPATIENT
Start: 2023-02-20 | End: 2023-05-21

## 2023-02-20 RX ORDER — ROSUVASTATIN CALCIUM 10 MG/1
10 TABLET, COATED ORAL NIGHTLY
Qty: 90 TABLET | Refills: 1 | Status: SHIPPED | OUTPATIENT
Start: 2023-02-20

## 2023-03-22 ENCOUNTER — TELEPHONE (OUTPATIENT)
Dept: FAMILY MEDICINE CLINIC | Facility: CLINIC | Age: 58
End: 2023-03-22

## 2023-03-22 DIAGNOSIS — Z12.11 COLON CANCER SCREENING: Primary | ICD-10-CM

## 2023-03-22 NOTE — TELEPHONE ENCOUNTER
Please inform patient cologuard order has . I have placed an order for him to complete a FIT test instead.

## 2023-06-27 ENCOUNTER — PATIENT MESSAGE (OUTPATIENT)
Dept: FAMILY MEDICINE CLINIC | Facility: CLINIC | Age: 58
End: 2023-06-27

## 2023-06-29 RX ORDER — METFORMIN HYDROCHLORIDE 500 MG/1
1000 TABLET, EXTENDED RELEASE ORAL 2 TIMES DAILY WITH MEALS
Qty: 360 TABLET | Refills: 3 | Status: SHIPPED | OUTPATIENT
Start: 2023-06-29

## 2023-07-31 ENCOUNTER — TELEPHONE (OUTPATIENT)
Dept: FAMILY MEDICINE CLINIC | Facility: CLINIC | Age: 58
End: 2023-07-31

## 2023-08-30 ENCOUNTER — EKG ENCOUNTER (OUTPATIENT)
Dept: LAB | Age: 58
End: 2023-08-30
Attending: FAMILY MEDICINE
Payer: COMMERCIAL

## 2023-08-30 ENCOUNTER — OFFICE VISIT (OUTPATIENT)
Dept: FAMILY MEDICINE CLINIC | Facility: CLINIC | Age: 58
End: 2023-08-30

## 2023-08-30 VITALS
HEART RATE: 88 BPM | DIASTOLIC BLOOD PRESSURE: 85 MMHG | BODY MASS INDEX: 27.7 KG/M2 | RESPIRATION RATE: 16 BRPM | HEIGHT: 73 IN | WEIGHT: 209 LBS | SYSTOLIC BLOOD PRESSURE: 123 MMHG

## 2023-08-30 DIAGNOSIS — R07.89 CHEST PRESSURE: ICD-10-CM

## 2023-08-30 DIAGNOSIS — E11.22 TYPE 2 DIABETES MELLITUS WITH STAGE 2 CHRONIC KIDNEY DISEASE, WITHOUT LONG-TERM CURRENT USE OF INSULIN: Primary | ICD-10-CM

## 2023-08-30 DIAGNOSIS — Z12.11 COLON CANCER SCREENING: ICD-10-CM

## 2023-08-30 DIAGNOSIS — N18.2 TYPE 2 DIABETES MELLITUS WITH STAGE 2 CHRONIC KIDNEY DISEASE, WITHOUT LONG-TERM CURRENT USE OF INSULIN: Primary | ICD-10-CM

## 2023-08-30 LAB
ATRIAL RATE: 87 BPM
CARTRIDGE LOT#: 603 NUMERIC
HEMOGLOBIN A1C: 8.9 % (ref 4.3–5.6)
P AXIS: 32 DEGREES
P-R INTERVAL: 134 MS
Q-T INTERVAL: 352 MS
QRS DURATION: 82 MS
QTC CALCULATION (BEZET): 423 MS
R AXIS: 45 DEGREES
T AXIS: 20 DEGREES
VENTRICULAR RATE: 87 BPM

## 2023-08-30 PROCEDURE — 93005 ELECTROCARDIOGRAM TRACING: CPT

## 2023-08-30 PROCEDURE — 93010 ELECTROCARDIOGRAM REPORT: CPT | Performed by: INTERNAL MEDICINE

## 2023-08-30 RX ORDER — TIRZEPATIDE 5 MG/.5ML
5 INJECTION, SOLUTION SUBCUTANEOUS WEEKLY
Qty: 2 ML | Refills: 0 | Status: SHIPPED | OUTPATIENT
Start: 2023-09-27

## 2023-08-30 RX ORDER — TIRZEPATIDE 2.5 MG/.5ML
2.5 INJECTION, SOLUTION SUBCUTANEOUS WEEKLY
Qty: 2 ML | Refills: 0 | Status: SHIPPED | OUTPATIENT
Start: 2023-08-30

## 2023-09-01 ENCOUNTER — TELEPHONE (OUTPATIENT)
Dept: FAMILY MEDICINE CLINIC | Facility: CLINIC | Age: 58
End: 2023-09-01
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
## 2023-09-01 NOTE — TELEPHONE ENCOUNTER
DION Junior Neither:  Patient contacted. EKG results reviewed with patient. Patient sent [employer] message (see below) Chest pains comes and goes; last 10 secs. Patient states he did discuss this at office visit. Advised he schedule stress test per Dr Junior Neither orders. Patient intends to. Red flags discussed with patient; Patient verbally understood to go to ER if has any significant changes, or worsening symptoms.

## 2023-09-16 DIAGNOSIS — E11.22 TYPE 2 DIABETES MELLITUS WITH STAGE 2 CHRONIC KIDNEY DISEASE, WITHOUT LONG-TERM CURRENT USE OF INSULIN: ICD-10-CM

## 2023-09-16 DIAGNOSIS — N18.2 TYPE 2 DIABETES MELLITUS WITH STAGE 2 CHRONIC KIDNEY DISEASE, WITHOUT LONG-TERM CURRENT USE OF INSULIN: ICD-10-CM

## 2023-09-16 RX ORDER — TIRZEPATIDE 2.5 MG/.5ML
2.5 INJECTION, SOLUTION SUBCUTANEOUS WEEKLY
Qty: 2 ML | Refills: 0 | Status: CANCELLED | OUTPATIENT
Start: 2023-09-16

## 2023-09-18 RX ORDER — TIRZEPATIDE 2.5 MG/.5ML
2.5 INJECTION, SOLUTION SUBCUTANEOUS WEEKLY
Qty: 6 ML | Refills: 0 | Status: SHIPPED | OUTPATIENT
Start: 2023-09-18

## 2023-09-18 NOTE — TELEPHONE ENCOUNTER
Can we please verify the coverage with [facility]?   The coupon should apply to the higher dose as well, it does not make sense that they will applied for the 2.5 and now to the 5 mg, my concern is that he would not have coverage from the insurance long-term if he does not go to the maintenance dose

## 2023-09-18 NOTE — TELEPHONE ENCOUNTER
Please review. Protocol failed / Has no protocol. Prasanna Navas, I called pharmacy and verified with them, that the 5mg mounjaro is on backorder, which is why they cannot process it with coupon/insurance. I have pended a refill of the 2.5mg, which Shairta says they have. Requested Prescriptions   Pending Prescriptions Disp Refills    Tirzepatide (MOUNJARO) 2.5 MG/0.5ML Subcutaneous Solution Pen-injector 2 mL 0     Sig: Inject 2.5 mg into the skin once a week.        Diabetes Medication Protocol Failed - 9/18/2023 11:56 AM        Failed - Last A1C < 7.5 and within past 6 months     Lab Results   Component Value Date    A1C 8.9 (A) 08/30/2023             Passed - In person appointment or virtual visit in the past 6 mos or appointment in next 3 mos     Recent Outpatient Visits              2 weeks ago Type 2 diabetes mellitus with stage 2 chronic kidney disease, without long-term current use of insulin     [facility], [address], [place] Vidhi Be MD    Office Visit    7 months ago Wellness examination    [address] [facility], [place] Vidhi Be MD    Office Visit    1 year ago High cholesterol    [facility] [address], [place] Vidhi Be MD    Office Visit    1 year ago Recurrent major depressive disorder, in partial remission [facility])    [address], [address], [place] Vidhi Be MD    Office Visit    2 years ago Uncontrolled type 2 diabetes mellitus with hyperglycemia [facility])    [address], [place] Vidhi Be MD    Office Visit          Future Appointments         Provider Department Appt Notes    In 3 weeks [facility], [address], [place] 6 wk fu                    Passed - [facility] or GFRNAA > 50     GFR Evaluation  EGFRCR: 63 , resulted on 2/20/2023          Passed - GFR in the past 12 months           Future Appointments         Provider Department Appt Notes    In 3 weeks Jairo Das MD [facility], [address], [place] 6 wk fu           Recent Outpatient Visits              2 weeks ago Type 2 diabetes mellitus with stage 2 chronic kidney disease, without long-term current use of insulin     [facility], [address], [place] Jairo Das MD    Office Visit    7 months ago Wellness examination    [address], [place] Jairo Das MD    Office Visit    1 year ago High cholesterol    [facility], [address], [place] Jairo Das MD    Office Visit    1 year ago Recurrent major depressive disorder, in partial remission [facility])    [address], [address], [place] Jairo Das MD    Office Visit    2 years ago Uncontrolled type 2 diabetes mellitus with hyperglycemia [facility])    [address], [place] Jairo Das MD    Office Visit

## 2023-09-18 NOTE — TELEPHONE ENCOUNTER
Noted, please let him know that that is the case, he can continue the 2.5 until the 5 is available but let him know that we will need to titrate up as eventually he may not have further approval to stay in the dose if he is not increasing, I can discuss further with him in his next appointment

## 2023-10-09 ENCOUNTER — OFFICE VISIT (OUTPATIENT)
Dept: FAMILY MEDICINE CLINIC | Facility: CLINIC | Age: 58
End: 2023-10-09

## 2023-10-09 VITALS
SYSTOLIC BLOOD PRESSURE: 124 MMHG | WEIGHT: 212.69 LBS | HEIGHT: 73 IN | DIASTOLIC BLOOD PRESSURE: 79 MMHG | BODY MASS INDEX: 28.19 KG/M2 | HEART RATE: 90 BPM | RESPIRATION RATE: 18 BRPM

## 2023-10-09 DIAGNOSIS — Z12.11 SCREEN FOR COLON CANCER: ICD-10-CM

## 2023-10-09 DIAGNOSIS — Z23 NEEDS FLU SHOT: ICD-10-CM

## 2023-10-09 DIAGNOSIS — E11.22 TYPE 2 DIABETES MELLITUS WITH STAGE 2 CHRONIC KIDNEY DISEASE, WITHOUT LONG-TERM CURRENT USE OF INSULIN: Primary | ICD-10-CM

## 2023-10-09 DIAGNOSIS — M25.511 ACUTE PAIN OF RIGHT SHOULDER: ICD-10-CM

## 2023-10-09 DIAGNOSIS — N18.2 TYPE 2 DIABETES MELLITUS WITH STAGE 2 CHRONIC KIDNEY DISEASE, WITHOUT LONG-TERM CURRENT USE OF INSULIN: Primary | ICD-10-CM

## 2023-10-09 PROCEDURE — 99214 OFFICE O/P EST MOD 30 MIN: CPT | Performed by: FAMILY MEDICINE

## 2023-10-09 PROCEDURE — 90471 IMMUNIZATION ADMIN: CPT | Performed by: FAMILY MEDICINE

## 2023-10-09 PROCEDURE — 3078F DIAST BP <80 MM HG: CPT | Performed by: FAMILY MEDICINE

## 2023-10-09 PROCEDURE — 3008F BODY MASS INDEX DOCD: CPT | Performed by: FAMILY MEDICINE

## 2023-10-09 PROCEDURE — 3074F SYST BP LT 130 MM HG: CPT | Performed by: FAMILY MEDICINE

## 2023-10-09 PROCEDURE — 90686 IIV4 VACC NO PRSV 0.5 ML IM: CPT | Performed by: FAMILY MEDICINE

## 2023-10-09 RX ORDER — MELOXICAM 7.5 MG/1
7.5 TABLET ORAL DAILY
Qty: 30 TABLET | Refills: 0 | Status: SHIPPED | OUTPATIENT
Start: 2023-10-09

## 2023-10-11 ENCOUNTER — TELEPHONE (OUTPATIENT)
Dept: ENDOCRINOLOGY | Facility: HOSPITAL | Age: 58
End: 2023-10-11

## 2023-10-11 NOTE — TELEPHONE ENCOUNTER
General: Navigator called pharmacy to obtain status on Mounjaro, per pharmacy staff Mounjaro is covered with a $1,198.00 co-pay. Pharmacy staff states a co-pay card is not processing, she will call Melissa to try resolve the problem.   Navigator called [facility] to obtain coverage summary for Mounjaro 2.5/ 0.5mL. Per representative patient has a high deductible plan that has not met, after deductible is met patient out of pocket will be $64.67.    Consent Verification   Assessment completed with: Patient   HIPPA verified? Yes    General: Introduction of Diabetes Navigator services was done. Contact information given.    Condition Update: Navigator informed patient Mounjaro has a high out of pocket due to his high deductible plan. Patient states he received Mounjaro last month for only $25 with a voucher. Navigator explained the voucher only works when insurance covers the medication and there's no high deductible. Per [facility] representative patient has not met his $4,500 deductible. Navigator reminded patient re-enrollment will start soon and suggested to review a low deductible plan. Patient stated he will worry about that when enrollment comes around, for now he will not use Mounjaro due to cost barrier. Navigator encourage to call with any questions or concerns, patient verbalized understanding.     Plan: follow up as needed

## 2023-10-12 NOTE — TELEPHONE ENCOUNTER
Patient's pharmacy calling, confirmed patient's name and .    Informed them that patient will hold mounjauro at this time per note below.

## 2023-10-12 NOTE — TELEPHONE ENCOUNTER
Noted, please instruct him to make a follow-up appointment in December, we will recheck his hemoglobin A1c and reevaluate need of medication adjustment if he is not controlled  on metformin only

## 2023-11-02 NOTE — TELEPHONE ENCOUNTER
Want to Say “Thank You” to a Nurse?  The SHAYLA Award® was created in memory of CHLOE Guerrier by his family to say thank you to bedside nurses who provide an outstanding level of care.    Submit a nomination using any method below.     OR    [email or web address]  Or visit the Resource section   on your [employer] eliane       LMTCB-ext [id number] today until 5:30pm

## 2023-11-09 ENCOUNTER — APPOINTMENT (OUTPATIENT)
Dept: LAB | Age: 58
End: 2023-11-09
Attending: FAMILY MEDICINE
Payer: COMMERCIAL

## 2023-11-13 RX ORDER — MELOXICAM 7.5 MG/1
7.5 TABLET ORAL DAILY
Qty: 30 TABLET | Refills: 0 | Status: SHIPPED | OUTPATIENT
Start: 2023-11-13

## 2023-11-13 NOTE — TELEPHONE ENCOUNTER
Refill passed per [address] [facility] protocol.   Last office visit 10/09/2023   Last refill 10/09/2023    Requested Prescriptions   Pending Prescriptions Disp Refills    MELOXICAM 7.5 MG Oral Tab [Pharmacy Med Name: MELOXICAM 7.5MG TABLETS] 30 tablet 0     Sig: TAKE 1 TABLET(7.5 MG) BY MOUTH DAILY       Non-Narcotic Pain Medication Protocol Passed - 11/11/2023 11:19 AM        Passed - In person appointment or virtual visit in the past 6 mos or appointment in next 3 mos     Recent Outpatient Visits              1 month ago Type 2 diabetes mellitus with stage 2 chronic kidney disease, without long-term current use of insulin     [facility] [address] [place] [address], [place] Martine Piccolo, MD    Office Visit    2 months ago Type 2 diabetes mellitus with stage 2 chronic kidney disease, without long-term current use of insulin     [facility], [facility], [place] Martine Piccolo, MD    Office Visit    8 months ago Wellness examination    [address] [facility], [place] Martine Piccolo, MD    Office Visit    1 year ago High cholesterol    [facility] [place] Martine Piccolo, MD    Office Visit    1 year ago Recurrent major depressive disorder, in partial remission [facility])    [address], [place] Martine Piccolo, MD    Office Visit                         Recent Outpatient Visits              1 month ago Type 2 diabetes mellitus with stage 2 chronic kidney disease, without long-term current use of insulin     [facility], [address] [place] [address], [place] Martine Piccolo, MD    Office Visit    2 months ago Type 2 diabetes mellitus with stage 2 chronic kidney disease, without long-term current use of insulin     [address], [facility], [place] Martine Piccolo, MD    Office Visit    8 months ago Wellness examination    [facility], [place] Domenick Pin, MD    Office Visit    1 year ago High cholesterol    [address], [address] [place], [place] Domenick Pin, MD    Office Visit    1 year ago Recurrent major depressive disorder, in partial remission [facility])    [address], [place], [place] Domenick Pin, MD    Office Visit

## 2024-01-01 NOTE — TELEPHONE ENCOUNTER
Please review. Protocol failed / No protocol. Requested Prescriptions   Pending Prescriptions Disp Refills    metFORMIN 500 MG Oral Tab 180 tablet 1     Sig: Take 2 tablets (1,000 mg total) by mouth 2 (two) times daily with meals.         Diabetes Medic Spoke to mom  Both children off albuterol no complaints today   Explained to mom that the vaccine approved only under 8 mon of age

## 2024-01-17 ENCOUNTER — TELEPHONE (OUTPATIENT)
Dept: FAMILY MEDICINE CLINIC | Facility: CLINIC | Age: 59
End: 2024-01-17

## 2024-01-17 DIAGNOSIS — E11.22 TYPE 2 DIABETES MELLITUS WITH STAGE 2 CHRONIC KIDNEY DISEASE, WITHOUT LONG-TERM CURRENT USE OF INSULIN  (HCC): ICD-10-CM

## 2024-01-17 DIAGNOSIS — N18.2 TYPE 2 DIABETES MELLITUS WITH STAGE 2 CHRONIC KIDNEY DISEASE, WITHOUT LONG-TERM CURRENT USE OF INSULIN  (HCC): ICD-10-CM

## 2024-01-19 RX ORDER — TIRZEPATIDE 2.5 MG/.5ML
2.5 INJECTION, SOLUTION SUBCUTANEOUS WEEKLY
Qty: 6 ML | Refills: 1 | Status: SHIPPED | OUTPATIENT
Start: 2024-01-19

## 2024-01-19 NOTE — TELEPHONE ENCOUNTER
Please review. Protocol failed / No Protocol.    Requested Prescriptions   Pending Prescriptions Disp Refills    Tirzepatide (MOUNJARO) 2.5 MG/0.5ML Subcutaneous Solution Pen-injector 6 mL 0     Sig: Inject 2.5 mg into the skin once a week.       Diabetes Medication Protocol Failed - 1/17/2024  5:39 PM        Failed - Last A1C < 7.5 and within past 6 months     Lab Results   Component Value Date    A1C 8.9 (A) 08/30/2023             Passed - In person appointment or virtual visit in the past 6 mos or appointment in next 3 mos     Recent Outpatient Visits              3 months ago Type 2 diabetes mellitus with stage 2 chronic kidney disease, without long-term current use of insulin     [facility] [place] Sarah Morales MD    Office Visit    4 months ago Type 2 diabetes mellitus with stage 2 chronic kidney disease, without long-term current use of insulin     [facility], [address], [place] Sarah Morales MD    Office Visit    11 months ago Wellness examination    [facility]    Office Visit    1 year ago High cholesterol    [facility], [address], [place] Sarah Morales MD    Office Visit    1 year ago Recurrent major depressive disorder, in partial remission (HCC)    [facility]    Office Visit                      Passed - EGFRCR or GFRNAA > 50     GFR Evaluation  EGFRCR: 63 , resulted on 2/20/2023          Passed - GFR in the past 12 months

## 2024-01-20 NOTE — TELEPHONE ENCOUNTER
Sarah Morales MD   to Em [facility]     1/19/24  8:43 AM  Prescription was sent but please instruct patient to make follow up appointment.      Please assist w/ scheduling with pt

## 2024-04-08 ENCOUNTER — OFFICE VISIT (OUTPATIENT)
Dept: FAMILY MEDICINE CLINIC | Facility: CLINIC | Age: 59
End: 2024-04-08
Payer: COMMERCIAL

## 2024-04-08 VITALS
BODY MASS INDEX: 27.59 KG/M2 | HEIGHT: 73 IN | DIASTOLIC BLOOD PRESSURE: 70 MMHG | HEART RATE: 93 BPM | SYSTOLIC BLOOD PRESSURE: 122 MMHG | WEIGHT: 208.13 LBS

## 2024-04-08 DIAGNOSIS — Z13.29 THYROID DISORDER SCREEN: ICD-10-CM

## 2024-04-08 DIAGNOSIS — E78.00 HIGH CHOLESTEROL: ICD-10-CM

## 2024-04-08 DIAGNOSIS — Z00.00 WELLNESS EXAMINATION: Primary | ICD-10-CM

## 2024-04-08 DIAGNOSIS — N18.2 TYPE 2 DIABETES MELLITUS WITH STAGE 2 CHRONIC KIDNEY DISEASE, WITHOUT LONG-TERM CURRENT USE OF INSULIN (HCC): ICD-10-CM

## 2024-04-08 DIAGNOSIS — E11.22 TYPE 2 DIABETES MELLITUS WITH STAGE 2 CHRONIC KIDNEY DISEASE, WITHOUT LONG-TERM CURRENT USE OF INSULIN (HCC): ICD-10-CM

## 2024-04-08 DIAGNOSIS — N18.2 CKD (CHRONIC KIDNEY DISEASE) STAGE 2, GFR 60-89 ML/MIN: ICD-10-CM

## 2024-04-08 NOTE — PROGRESS NOTES
HPI:   Abhijit Villareal is a 58 year old male who presents for an Annual Health Visit.   Patient is here for follow-up as well, he reports that he has been doing very well with the use of Mounjaro, glucose has been consistently below 180 even postprandial, he has been using metformin less to about 2 tablets per night.  No new complaint reported at this time.  Reports that mood is overall stable.  As noted changes in his chest with his weight loss  Allergies:     Allergies   Allergen Reactions    Sulfa Antibiotics HIVES       CURRENT MEDICATIONS   Current Outpatient Medications   Medication Sig Dispense Refill    Tirzepatide (MOUNJARO) 2.5 MG/0.5ML Subcutaneous Solution Pen-injector Inject 2.5 mg into the skin once a week. 6 mL 1    metFORMIN  MG Oral Tablet 24 Hr Take 2 tablets (1,000 mg total) by mouth 2 (two) times daily with meals. 360 tablet 3    rosuvastatin 10 MG Oral Tab Take 1 tablet (10 mg total) by mouth nightly. 90 tablet 3    Meloxicam 7.5 MG Oral Tab Take 1 tablet (7.5 mg total) by mouth daily. (Patient not taking: Reported on 4/8/2024) 30 tablet 0      HISTORICAL INFORMATION   Past Medical History:   Diagnosis Date    Calculus of kidney     Diabetes (HCC)     Essential hypertension     Hyperlipidemia     Multiple fractures of fingers     Sciatica 2003      Past Surgical History:   Procedure Laterality Date    CYSTOSCOPY,INSERT URETERAL STENT      LASIK Bilateral 2003    LASIK Plus    LITHOTRIPSY        Family History   Problem Relation Age of Onset    Diabetes Father     Heart Disease Father     Diabetes Mother     Breast Cancer Mother     Hypertension Mother     Melanoma Sister     Glaucoma Neg       SOCIAL HISTORY   Social History     Socioeconomic History    Marital status: Single   Tobacco Use    Smoking status: Never    Smokeless tobacco: Never   Vaping Use    Vaping Use: Never used   Substance and Sexual Activity    Alcohol use: Yes     Alcohol/week: 6.0 standard drinks of alcohol      Types: 6 Cans of beer per week     Comment: occ BEER    Drug use: No   Other Topics Concern    Caffeine Concern Yes     Comment: Soda > 6 cups daily     Social History     Social History Narrative    Not on file        REVIEW OF SYSTEMS:     Review of Systems   Constitutional: Negative.    Respiratory: Negative.     Cardiovascular: Negative.    Gastrointestinal: Negative.    Skin: Negative.    Neurological: Negative.          EXAM:   /70   Pulse 93   Ht 6' 1\" (1.854 m)   Wt 208 lb 1.6 oz (94.4 kg)   BMI 27.46 kg/m²    Wt Readings from Last 6 Encounters:   04/08/24 208 lb 1.6 oz (94.4 kg)   10/09/23 212 lb 11.2 oz (96.5 kg)   08/30/23 209 lb (94.8 kg)   02/20/23 207 lb 9.6 oz (94.2 kg)   03/21/22 204 lb 3.2 oz (92.6 kg)   02/14/22 203 lb 9.6 oz (92.4 kg)     Body mass index is 27.46 kg/m².    Physical Exam  Constitutional:       Appearance: He is well-developed.   Cardiovascular:      Rate and Rhythm: Normal rate and regular rhythm.      Heart sounds: Normal heart sounds.   Pulmonary:      Effort: Pulmonary effort is normal.      Breath sounds: Normal breath sounds.   Chest:       Abdominal:      General: Bowel sounds are normal.      Palpations: Abdomen is soft.   Skin:     General: Skin is warm and dry.   Neurological:      Mental Status: He is alert and oriented to person, place, and time.      Deep Tendon Reflexes: Reflexes are normal and symmetric.          ASSESSMENT AND PLAN:   Hernandez was seen today for physical.    Diagnoses and all orders for this visit:    Wellness examination  -     Comp Metabolic Panel (14); Future  -     Hemoglobin A1C; Future  -     Lipid Panel; Future  -     TSH W Reflex To Free T4; Future    Thyroid disorder screen  -     TSH W Reflex To Free T4; Future    CKD (chronic kidney disease) stage 2, GFR 60-89 ml/min  -     Comp Metabolic Panel (14); Future    Type 2 diabetes mellitus with stage 2 chronic kidney disease, without long-term current use of insulin (HCC)  -     Hemoglobin  A1C; Future  -     OPHTHALMOLOGY - INTERNAL  -     Microalb/Creat Ratio, Random Urine; Future    High cholesterol  -     Lipid Panel; Future    Patient will be completing updated blood work today, from there we will decide need to adjust tirzepatide and adjustment in metformin.  Patient was reassured about xiphoid process, more evident due to weight loss.      The patient indicates understanding of these issues and agrees to the plan.    Problem List:  Patient Active Problem List   Diagnosis    Screen for colon cancer    Hepatic steatosis    Excessive drinking alcohol    Kidney stone    Recurrent nephrolithiasis    High serum testosterone    Diabetes mellitus without complication (HCC)    Age-related nuclear cataract of both eyes    Glaucoma suspect, bilateral    CKD (chronic kidney disease) stage 2, GFR 60-89 ml/min    Type 2 diabetes mellitus with diabetic chronic kidney disease (HCC)    Recurrent major depressive disorder, in partial remission (HCC)       Sarah Webb MD  4/8/2024  4:52 PM

## 2024-04-27 ENCOUNTER — LAB ENCOUNTER (OUTPATIENT)
Dept: LAB | Age: 59
End: 2024-04-27
Attending: FAMILY MEDICINE
Payer: COMMERCIAL

## 2024-04-27 DIAGNOSIS — E78.00 HIGH CHOLESTEROL: ICD-10-CM

## 2024-04-27 DIAGNOSIS — Z13.29 THYROID DISORDER SCREEN: ICD-10-CM

## 2024-04-27 DIAGNOSIS — N18.2 CKD (CHRONIC KIDNEY DISEASE) STAGE 2, GFR 60-89 ML/MIN: ICD-10-CM

## 2024-04-27 DIAGNOSIS — E11.22 TYPE 2 DIABETES MELLITUS WITH STAGE 2 CHRONIC KIDNEY DISEASE, WITHOUT LONG-TERM CURRENT USE OF INSULIN (HCC): ICD-10-CM

## 2024-04-27 DIAGNOSIS — N18.2 TYPE 2 DIABETES MELLITUS WITH STAGE 2 CHRONIC KIDNEY DISEASE, WITHOUT LONG-TERM CURRENT USE OF INSULIN (HCC): ICD-10-CM

## 2024-04-27 DIAGNOSIS — Z00.00 WELLNESS EXAMINATION: ICD-10-CM

## 2024-04-27 LAB
ALBUMIN SERPL-MCNC: 3.9 G/DL (ref 3.2–4.8)
ALBUMIN/GLOB SERPL: 1.8 {RATIO} (ref 1–2)
ALP LIVER SERPL-CCNC: 52 U/L
ALT SERPL-CCNC: 37 U/L
ANION GAP SERPL CALC-SCNC: 7 MMOL/L (ref 0–18)
AST SERPL-CCNC: 23 U/L (ref ?–34)
BILIRUB SERPL-MCNC: 1.1 MG/DL (ref 0.3–1.2)
BUN BLD-MCNC: 12 MG/DL (ref 9–23)
BUN/CREAT SERPL: 10.3 (ref 10–20)
CALCIUM BLD-MCNC: 8.8 MG/DL (ref 8.7–10.4)
CHLORIDE SERPL-SCNC: 106 MMOL/L (ref 98–112)
CHOLEST SERPL-MCNC: 169 MG/DL (ref ?–200)
CO2 SERPL-SCNC: 25 MMOL/L (ref 21–32)
CREAT BLD-MCNC: 1.17 MG/DL
CREAT UR-SCNC: 194.2 MG/DL
EGFRCR SERPLBLD CKD-EPI 2021: 72 ML/MIN/1.73M2 (ref 60–?)
EST. AVERAGE GLUCOSE BLD GHB EST-MCNC: 163 MG/DL (ref 68–126)
FASTING PATIENT LIPID ANSWER: YES
FASTING STATUS PATIENT QL REPORTED: YES
GLOBULIN PLAS-MCNC: 2.2 G/DL (ref 2.8–4.4)
GLUCOSE BLD-MCNC: 258 MG/DL (ref 70–99)
HBA1C MFR BLD: 7.3 % (ref ?–5.7)
HDLC SERPL-MCNC: 33 MG/DL (ref 40–59)
LDLC SERPL CALC-MCNC: 98 MG/DL (ref ?–100)
MICROALBUMIN UR-MCNC: 1.2 MG/DL
MICROALBUMIN/CREAT 24H UR-RTO: 6.2 UG/MG (ref ?–30)
NONHDLC SERPL-MCNC: 136 MG/DL (ref ?–130)
OSMOLALITY SERPL CALC.SUM OF ELEC: 295 MOSM/KG (ref 275–295)
POTASSIUM SERPL-SCNC: 4.1 MMOL/L (ref 3.5–5.1)
PROT SERPL-MCNC: 6.1 G/DL (ref 5.7–8.2)
SODIUM SERPL-SCNC: 138 MMOL/L (ref 136–145)
TRIGL SERPL-MCNC: 220 MG/DL (ref 30–149)
TSI SER-ACNC: 0.89 MIU/ML (ref 0.55–4.78)
VLDLC SERPL CALC-MCNC: 37 MG/DL (ref 0–30)

## 2024-04-27 PROCEDURE — 83036 HEMOGLOBIN GLYCOSYLATED A1C: CPT

## 2024-04-27 PROCEDURE — 82570 ASSAY OF URINE CREATININE: CPT

## 2024-04-27 PROCEDURE — 80053 COMPREHEN METABOLIC PANEL: CPT

## 2024-04-27 PROCEDURE — 80061 LIPID PANEL: CPT

## 2024-04-27 PROCEDURE — 36415 COLL VENOUS BLD VENIPUNCTURE: CPT

## 2024-04-27 PROCEDURE — 84443 ASSAY THYROID STIM HORMONE: CPT

## 2024-04-27 PROCEDURE — 82043 UR ALBUMIN QUANTITATIVE: CPT

## 2024-04-29 ENCOUNTER — TELEPHONE (OUTPATIENT)
Dept: FAMILY MEDICINE CLINIC | Facility: CLINIC | Age: 59
End: 2024-04-29

## 2024-04-29 DIAGNOSIS — E78.00 HIGH CHOLESTEROL: ICD-10-CM

## 2024-04-29 RX ORDER — ROSUVASTATIN CALCIUM 20 MG/1
20 TABLET, COATED ORAL NIGHTLY
Qty: 90 TABLET | Refills: 1 | Status: SHIPPED | OUTPATIENT
Start: 2024-04-29 | End: 2024-10-26

## 2024-04-29 NOTE — TELEPHONE ENCOUNTER
Tirzepatide (MOUNJARO) 2.5 MG/0.5ML Subcutaneous Solution Pen-injector, Inject 2.5 mg into the skin once a week., Disp: 6 mL, Rfl: 1      Key:[id number]  Patient Last name: Mono  : 1965

## 2024-04-30 NOTE — TELEPHONE ENCOUNTER
Approved    Prior authorization approved  Payer: [facility] Case ID: [id number]    [phone]  Note from payer: The case has been Approved from   to

## 2024-05-01 ENCOUNTER — TELEPHONE (OUTPATIENT)
Dept: FAMILY MEDICINE CLINIC | Facility: CLINIC | Age: 59
End: 2024-05-01

## 2024-05-01 NOTE — TELEPHONE ENCOUNTER
Tirzepatide (MOUNJARO) 2.5 MG/0.5ML Subcutaneous Solution Pen-injector, Inject 2.5 mg into the skin once a week., Disp: 6 mL, Rfl: 1      Key:[id number]  Patient Last name: Mono  : 1965

## 2024-06-24 DIAGNOSIS — E11.22 TYPE 2 DIABETES MELLITUS WITH STAGE 2 CHRONIC KIDNEY DISEASE, WITHOUT LONG-TERM CURRENT USE OF INSULIN (HCC): ICD-10-CM

## 2024-06-24 DIAGNOSIS — N18.2 TYPE 2 DIABETES MELLITUS WITH STAGE 2 CHRONIC KIDNEY DISEASE, WITHOUT LONG-TERM CURRENT USE OF INSULIN (HCC): ICD-10-CM

## 2024-06-26 RX ORDER — TIRZEPATIDE 2.5 MG/.5ML
2.5 INJECTION, SOLUTION SUBCUTANEOUS WEEKLY
Qty: 6 ML | Refills: 1 | Status: SHIPPED | OUTPATIENT
Start: 2024-06-26 | End: 2024-06-27 | Stop reason: DRUGHIGH

## 2024-06-26 RX ORDER — TIRZEPATIDE 5 MG/.5ML
5 INJECTION, SOLUTION SUBCUTANEOUS WEEKLY
Qty: 2 ML | Refills: 0 | OUTPATIENT
Start: 2024-06-26

## 2024-06-26 NOTE — TELEPHONE ENCOUNTER
REFILL PASSED PER [facility]    Requested Prescriptions   Pending Prescriptions Disp Refills    MOUNJARO 2.5 MG/0.5ML Subcutaneous Solution Pen-injector [Pharmacy Med Name: MOUNJARO 2.5MG/0.5ML INJ ( 4 PENS)] 6 mL 1     Sig: ADMINISTER 2.5 MG UNDER THE SKIN 1 TIME A WEEK       Diabetes Medication Protocol Passed - 6/24/2024  8:53 AM        Passed - Last A1C < 7.5 and within past 6 months     Lab Results   Component Value Date    A1C 7.3 (H) 04/27/2024             Passed - In person appointment or virtual visit in the past 6 mos or appointment in next 3 mos     Recent Outpatient Visits              2 months ago Wellness examination    [facility], [address], [place] Sarah Morales MD    Office Visit    8 months ago Type 2 diabetes mellitus with stage 2 chronic kidney disease, without long-term current use of insulin ([facility])    [facility], [address], [place] Sarah Morales MD    Office Visit    10 months ago Type 2 diabetes mellitus with stage 2 chronic kidney disease, without long-term current use of insulin ([facility])    [facility], [address], [place] Sarah Morales MD    Office Visit    1 year ago Wellness examination    [facility], [address], [place] Sarah Morales MD    Office Visit    2 years ago High cholesterol    [facility], [address], [place] Sarah Morales MD    Office Visit                      Passed - Microalbumin procedure in past 12 months or taking ACE/ARB        Passed - EGFRCR or GFRNAA > 50     GFR Evaluation  EGFRCR: 72 , resulted on 4/27/2024          Passed - GFR in the past 12 months         Refused Prescriptions Disp Refills    MOUNJARO 5 MG/0.5ML Subcutaneous Solution Pen-injector [Pharmacy Med Name: MOUNJARO 5MG/0.5ML INJ (4 PENS)] 2 mL 0     Sig: INJECT 5 MG INTO THE SKIN ONCE A WEEK       Diabetes Medication Protocol Passed -  6/24/2024  8:53 AM        Passed - Last A1C < 7.5 and within past 6 months     Lab Results   Component Value Date    A1C 7.3 (H) 04/27/2024             Passed - In person appointment or virtual visit in the past 6 mos or appointment in next 3 mos     Recent Outpatient Visits              2 months ago Wellness examination    [facility], [address], [place] Sarah Morales MD    Office Visit    8 months ago Type 2 diabetes mellitus with stage 2 chronic kidney disease, without long-term current use of insulin ([facility])    [facility], [address], [place] Sarah Morales MD    Office Visit    10 months ago Type 2 diabetes mellitus with stage 2 chronic kidney disease, without long-term current use of insulin ([facility])    [facility], [address], [place] Sarah Morales MD    Office Visit    1 year ago Wellness examination    [facility], [address], [place] Sarah Morales MD    Office Visit    2 years ago High cholesterol    [facility], [address], [place] Sarah Morales MD    Office Visit                      Passed - Microalbumin procedure in past 12 months or taking ACE/ARB        Passed - EGFRCR or GFRNAA > 50     GFR Evaluation  EGFRCR: 72 , resulted on 4/27/2024          Passed - GFR in the past 12 months               Recent Outpatient Visits              2 months ago Wellness examination    [facility], [address], [place] Sarah Morales MD    Office Visit    8 months ago Type 2 diabetes mellitus with stage 2 chronic kidney disease, without long-term current use of insulin ([facility])    [facility], [address], [place] Sarah Morales MD    Office Visit    10 months ago Type 2 diabetes mellitus with stage 2 chronic kidney disease, without long-term current use of insulin ([facility])    [facility],  [place] Ariza Altahona, Maria D, MD    Office Visit    1 year ago Wellness examination    [facility], [place] Sarah Morales MD    Office Visit    2 years ago High cholesterol    [facility], [place] Sarah Morales MD    Office Visit

## 2024-08-26 ENCOUNTER — TELEPHONE (OUTPATIENT)
Dept: FAMILY MEDICINE CLINIC | Facility: CLINIC | Age: 59
End: 2024-08-26

## 2024-08-26 DIAGNOSIS — E11.9 DIABETES MELLITUS WITHOUT COMPLICATION (HCC): ICD-10-CM

## 2024-08-27 ENCOUNTER — TELEPHONE (OUTPATIENT)
Dept: FAMILY MEDICINE CLINIC | Facility: CLINIC | Age: 59
End: 2024-08-27

## 2024-08-27 DIAGNOSIS — E11.9 DIABETES MELLITUS WITHOUT COMPLICATION (HCC): ICD-10-CM

## 2024-08-27 RX ORDER — TIRZEPATIDE 5 MG/.5ML
5 INJECTION, SOLUTION SUBCUTANEOUS WEEKLY
Qty: 4 ML | Refills: 2 | Status: SHIPPED | OUTPATIENT
Start: 2024-08-27

## 2024-08-27 RX ORDER — TIRZEPATIDE 5 MG/.5ML
5 INJECTION, SOLUTION SUBCUTANEOUS WEEKLY
Qty: 4 ML | Refills: 2 | OUTPATIENT
Start: 2024-08-27

## 2024-08-27 NOTE — TELEPHONE ENCOUNTER
Please call patient to make an appointment to establish care with new provider as Dr. Han is no longer with our group.  Thank you

## 2024-08-27 NOTE — TELEPHONE ENCOUNTER
Please review. Protocol Pass    Former patient of Dr. Guille Guillen message sent to patient to schedule an office visit with Provider and/or  Routed to Patient  for assistance with appointment.     Requested Prescriptions   Pending Prescriptions Disp Refills    Tirzepatide (MOUNJARO) 5 MG/0.5ML Subcutaneous Solution Pen-injector 4 mL 0     Sig: Inject 5 mg into the skin once a week.       Diabetes Medication Protocol Passed - 8/26/2024  6:08 AM        Passed - Last A1C < 7.5 and within past 6 months     Lab Results   Component Value Date    A1C 7.3 (H) 04/27/2024             Passed - In person appointment or virtual visit in the past 6 mos or appointment in next 3 mos     Recent Outpatient Visits              4 months ago Wellness examination    [facility], [address], [place] Sarah Morales MD    Office Visit    10 months ago Type 2 diabetes mellitus with stage 2 chronic kidney disease, without long-term current use of insulin ([facility])    [facility], [address], [place] Sarah Morales MD    Office Visit    12 months ago Type 2 diabetes mellitus with stage 2 chronic kidney disease, without long-term current use of insulin ([facility])    [facility], [address], [place] Sarah Morales MD    Office Visit    1 year ago Wellness examination    [facility], [address], [place] Sarah Morales MD    Office Visit    2 years ago High cholesterol    [facility], [address], [place] Sarah Morales MD    Office Visit                      Passed - Microalbumin procedure in past 12 months or taking ACE/ARB        Passed - EGFRCR or GFRNAA > 50     GFR Evaluation  EGFRCR: 72 , resulted on 4/27/2024          Passed - GFR in the past 12 months                 Recent Outpatient Visits              4 months ago Wellness examination    [facility]  [address], [place] Sarah Morales MD    Office Visit    10 months ago Type 2 diabetes mellitus with stage 2 chronic kidney disease, without long-term current use of insulin ([facility])    [facility], [address], [place] Sarah Morales MD    Office Visit    12 months ago Type 2 diabetes mellitus with stage 2 chronic kidney disease, without long-term current use of insulin ([facility])    [facility], [address], [place] Sarah Morales MD    Office Visit    1 year ago Wellness examination    [facility], [address], [place] Sarah Morales MD    Office Visit    2 years ago High cholesterol    [facility], [address], [place] Sarah Morales MD    Office Visit

## 2024-08-27 NOTE — TELEPHONE ENCOUNTER
Patient should establish care within the next 2 Months with a new primary care physician as their previous primary care physician is no longer practicing/taking their insurance.  For now will refill only refill maintenance medications for 90 days (no further refills by this MD without a visit). At time of establish care visit will  appropriate medications and bring all current medications and supplements to visit (if they have HTN bring BP log and if diabetic checking fingerstick's bring log as well.).

## 2024-08-27 NOTE — TELEPHONE ENCOUNTER
Current Outpatient Medications   Medication Sig Dispense Refill    Tirzepatide (MOUNJARO) 5 MG/0.5ML Subcutaneous Solution Pen-injector Inject 5 mg into the skin once a week. 4 mL 0

## 2024-08-27 NOTE — TELEPHONE ENCOUNTER
Duplicate request, previously addressed.    Patient requested 3 times. Order was sent with refills by Dr. Sumner 8/27/24.

## 2024-08-28 NOTE — TELEPHONE ENCOUNTER
Future Appointments   Date Time Provider Department Center   9/10/2024  6:30 PM [facility] [place]     Notified via simpleFLOORS.  Postponed to confirm patient has reviewed message.

## 2024-08-30 NOTE — TELEPHONE ENCOUNTER
Patient has scheduled an appointment with EBONY Moreno    Future Appointments   Date Time Provider Department Center   9/10/2024  6:30 PM Yakov Moreno PA-C [facility] [place]

## 2024-09-10 ENCOUNTER — OFFICE VISIT (OUTPATIENT)
Dept: FAMILY MEDICINE CLINIC | Facility: CLINIC | Age: 59
End: 2024-09-10
Payer: COMMERCIAL

## 2024-09-10 VITALS
DIASTOLIC BLOOD PRESSURE: 88 MMHG | WEIGHT: 202 LBS | HEART RATE: 75 BPM | BODY MASS INDEX: 26.77 KG/M2 | SYSTOLIC BLOOD PRESSURE: 134 MMHG | HEIGHT: 73 IN

## 2024-09-10 DIAGNOSIS — E11.9 TYPE 2 DIABETES MELLITUS WITHOUT COMPLICATION, WITHOUT LONG-TERM CURRENT USE OF INSULIN (HCC): Primary | ICD-10-CM

## 2024-09-10 DIAGNOSIS — I49.9 IRREGULAR HEART BEAT: ICD-10-CM

## 2024-09-10 DIAGNOSIS — Z12.11 SCREENING FOR MALIGNANT NEOPLASM OF COLON: ICD-10-CM

## 2024-09-10 PROBLEM — E11.22 TYPE 2 DIABETES MELLITUS WITH DIABETIC CHRONIC KIDNEY DISEASE (HCC): Status: RESOLVED | Noted: 2022-03-21 | Resolved: 2024-09-10

## 2024-09-10 PROBLEM — N18.2 CKD (CHRONIC KIDNEY DISEASE) STAGE 2, GFR 60-89 ML/MIN: Status: RESOLVED | Noted: 2021-09-22 | Resolved: 2024-09-10

## 2024-09-10 PROBLEM — H40.003 GLAUCOMA SUSPECT, BILATERAL: Status: RESOLVED | Noted: 2019-07-30 | Resolved: 2024-09-10

## 2024-09-10 PROBLEM — H25.13 AGE-RELATED NUCLEAR CATARACT OF BOTH EYES: Status: RESOLVED | Noted: 2019-07-30 | Resolved: 2024-09-10

## 2024-09-10 LAB — HEMOGLOBIN A1C: 8 % (ref 4.3–5.6)

## 2024-09-10 PROCEDURE — 83036 HEMOGLOBIN GLYCOSYLATED A1C: CPT | Performed by: PHYSICIAN ASSISTANT

## 2024-09-10 PROCEDURE — 3075F SYST BP GE 130 - 139MM HG: CPT | Performed by: PHYSICIAN ASSISTANT

## 2024-09-10 PROCEDURE — 3052F HG A1C>EQUAL 8.0%<EQUAL 9.0%: CPT | Performed by: PHYSICIAN ASSISTANT

## 2024-09-10 PROCEDURE — 99213 OFFICE O/P EST LOW 20 MIN: CPT | Performed by: PHYSICIAN ASSISTANT

## 2024-09-10 PROCEDURE — 3008F BODY MASS INDEX DOCD: CPT | Performed by: PHYSICIAN ASSISTANT

## 2024-09-10 PROCEDURE — 3079F DIAST BP 80-89 MM HG: CPT | Performed by: PHYSICIAN ASSISTANT

## 2024-09-10 PROCEDURE — 3061F NEG MICROALBUMINURIA REV: CPT | Performed by: PHYSICIAN ASSISTANT

## 2024-09-10 PROCEDURE — 3051F HG A1C>EQUAL 7.0%<8.0%: CPT | Performed by: PHYSICIAN ASSISTANT

## 2024-09-10 RX ORDER — TIRZEPATIDE 7.5 MG/.5ML
7.5 INJECTION, SOLUTION SUBCUTANEOUS WEEKLY
Qty: 6 ML | Refills: 0 | Status: SHIPPED | OUTPATIENT
Start: 2024-09-10 | End: 2024-12-09

## 2024-09-10 NOTE — PROGRESS NOTES
HPI:     HPI  A 59-year-old male is in the office for a follow-up. The patient is doing fine at this time. The patient denies chest pain, SOB, N/V/C/D, fever, dizziness, syncope, and abdominal pain. There are no other concerns today.    Medications:     Current Outpatient Medications   Medication Sig Dispense Refill    empagliflozin (JARDIANCE) 10 MG Oral Tab Take 1 tablet (10 mg total) by mouth daily. 90 tablet 1    Tirzepatide (MOUNJARO) 7.5 MG/0.5ML Subcutaneous Solution Pen-injector Inject 7.5 mg into the skin once a week. 6 mL 0    rosuvastatin 20 MG Oral Tab Take 1 tablet (20 mg total) by mouth nightly. 90 tablet 1    metFORMIN  MG Oral Tablet 24 Hr Take 2 tablets (1,000 mg total) by mouth 2 (two) times daily with meals. 360 tablet 3       Allergies:     Allergies   Allergen Reactions    Sulfa Antibiotics HIVES       History:     Health Maintenance   Topic Date Due    Diabetes Care Dilated Eye Exam  07/30/2020    COVID-19 Vaccine (3 - 2023-24 season) 09/01/2024    Colorectal Cancer Screening  11/09/2024    Influenza Vaccine (1) 10/01/2024    Diabetes Care Foot Exam  10/09/2024    Diabetes Care A1C  10/27/2024    PSA  02/20/2025    Annual Physical  04/08/2025    Diabetes Care: GFR  04/27/2025    Diabetes Care: Microalb/Creat Ratio  04/27/2025    Pneumococcal Vaccine: Birth to 64yrs (3 of 3 - PPSV23 or PCV20) 05/07/2030    DTaP,Tdap,and Td Vaccines (3 - Td or Tdap) 02/20/2033    Annual Depression Screening  Completed    Zoster Vaccines  Completed       No LMP for male patient.   Past Medical History:     Past Medical History:    Calculus of kidney    Diabetes (HCC)    Essential hypertension    Hyperlipidemia    Multiple fractures of fingers    Sciatica       Past Surgical History:     Past Surgical History:   Procedure Laterality Date    Cystoscopy,insert ureteral stent      Lasik Bilateral 2003    LASIK Plus    Lithotripsy         Family History:     Family History   Problem Relation Age of Onset     Diabetes Father     Heart Disease Father     Diabetes Mother     Breast Cancer Mother     Hypertension Mother     Melanoma Sister     Glaucoma Neg        Social History:     Social History     Socioeconomic History    Marital status: Single     Spouse name: Not on file    Number of children: Not on file    Years of education: Not on file    Highest education level: Not on file   Occupational History    Not on file   Tobacco Use    Smoking status: Never    Smokeless tobacco: Never   Vaping Use    Vaping status: Never Used   Substance and Sexual Activity    Alcohol use: Yes     Alcohol/week: 6.0 standard drinks of alcohol     Types: 6 Cans of beer per week     Comment: occ BEER    Drug use: No    Sexual activity: Not on file   Other Topics Concern     Service Not Asked    Blood Transfusions Not Asked    Caffeine Concern Yes     Comment: Soda > 6 cups daily    Occupational Exposure Not Asked    Hobby Hazards Not Asked    Sleep Concern Not Asked    Stress Concern Not Asked    Weight Concern Not Asked    Special Diet Not Asked    Back Care Not Asked    Exercise Not Asked    Bike Helmet Not Asked    Seat Belt Not Asked    Self-Exams Not Asked   Social History Narrative    Not on file     Social Determinants of Health     Financial Resource Strain: Not on file   Food Insecurity: Not on file   Transportation Needs: Not on file   Physical Activity: Not on file   Stress: Not on file   Social Connections: Not on file   Housing Stability: At Risk (8/18/2023)    [facility] [employer]     Living Situation: Not on file     Housing Problems: Not on file       Review of Systems:   Review of Systems   Constitutional:  Negative for activity change, chills, fatigue and fever.   HENT:  Negative for congestion, ear discharge, ear pain, postnasal drip, rhinorrhea, sinus pressure, sinus pain and sore throat.    Respiratory:  Negative for cough, chest tightness, shortness of breath and wheezing.     Cardiovascular:  Negative for chest pain and palpitations.   Gastrointestinal:  Negative for abdominal distention, abdominal pain, blood in stool, constipation, diarrhea, nausea and vomiting.   Skin:  Negative for rash.        Vitals:    09/10/24 1809   BP: 134/88   Pulse: 75   Weight: 202 lb (91.6 kg)   Height: 6' 1\" (1.854 m)     Body mass index is 26.65 kg/m².    Physical Exam:   Physical Exam  Vitals reviewed.   Cardiovascular:      Rate and Rhythm: Normal rate. Rhythm irregular.      Heart sounds: Normal heart sounds.   Pulmonary:      Effort: Pulmonary effort is normal.      Breath sounds: Normal breath sounds.          Assessment and Plan::     Problem List Items Addressed This Visit          HCC Problems    Type 2 diabetes mellitus without complication, without long-term current use of insulin (HCC) - Primary    Relevant Medications    empagliflozin (JARDIANCE) 10 MG Oral Tab    Tirzepatide (MOUNJARO) 7.5 MG/0.5ML Subcutaneous Solution Pen-injector    Other Relevant Orders    POC Glycohemoglobin [44584] (Completed)    Ophthalmology Referral - In Network    Comp Metabolic Panel (14)    Lipid Panel    EKG 12 Lead    Continue with Metformin 1000 mg PO BID. Start Jardiance 10 mg QAM and Increase Mounjaro 7.5 mg Qweek.    Bilateral barefoot skin diabetic exam is normal, visualized feet and the appearance is normal.  Bilateral monofilament/sensation of both feet is normal.  Pulsation pedal pulse exam of both lower legs/feet is normal as well.         Other Visit Diagnoses       Screening for malignant neoplasm of colon        Relevant Orders    Occult Blood, Fecal, FIT Immunoassay    Irregular heart beat        Relevant Orders    EKG 12 Lead          Discussed plan of care with pt and pt is in agreement.All questions answered. Pt to call with questions or concerns.

## 2024-09-16 ENCOUNTER — EKG ENCOUNTER (OUTPATIENT)
Dept: LAB | Age: 59
End: 2024-09-16
Attending: PHYSICIAN ASSISTANT
Payer: COMMERCIAL

## 2024-09-16 ENCOUNTER — LAB ENCOUNTER (OUTPATIENT)
Dept: LAB | Age: 59
End: 2024-09-16
Attending: PHYSICIAN ASSISTANT
Payer: COMMERCIAL

## 2024-09-16 DIAGNOSIS — E11.9 TYPE 2 DIABETES MELLITUS WITHOUT COMPLICATION, WITHOUT LONG-TERM CURRENT USE OF INSULIN (HCC): ICD-10-CM

## 2024-09-16 DIAGNOSIS — I49.9 IRREGULAR HEART BEAT: ICD-10-CM

## 2024-09-16 LAB
ALBUMIN SERPL-MCNC: 4 G/DL (ref 3.2–4.8)
ALBUMIN/GLOB SERPL: 1.7 {RATIO} (ref 1–2)
ALP LIVER SERPL-CCNC: 56 U/L
ALT SERPL-CCNC: 30 U/L
ANION GAP SERPL CALC-SCNC: 9 MMOL/L (ref 0–18)
AST SERPL-CCNC: 21 U/L (ref ?–34)
ATRIAL RATE: 87 BPM
BILIRUB SERPL-MCNC: 1.4 MG/DL (ref 0.3–1.2)
BUN BLD-MCNC: 16 MG/DL (ref 9–23)
BUN/CREAT SERPL: 12.1 (ref 10–20)
CALCIUM BLD-MCNC: 8.9 MG/DL (ref 8.7–10.4)
CHLORIDE SERPL-SCNC: 106 MMOL/L (ref 98–112)
CHOLEST SERPL-MCNC: 122 MG/DL (ref ?–200)
CO2 SERPL-SCNC: 24 MMOL/L (ref 21–32)
CREAT BLD-MCNC: 1.32 MG/DL
EGFRCR SERPLBLD CKD-EPI 2021: 62 ML/MIN/1.73M2 (ref 60–?)
FASTING PATIENT LIPID ANSWER: YES
FASTING STATUS PATIENT QL REPORTED: YES
GLOBULIN PLAS-MCNC: 2.4 G/DL (ref 2–3.5)
GLUCOSE BLD-MCNC: 140 MG/DL (ref 70–99)
HDLC SERPL-MCNC: 28 MG/DL (ref 40–59)
LDLC SERPL CALC-MCNC: 60 MG/DL (ref ?–100)
NONHDLC SERPL-MCNC: 94 MG/DL (ref ?–130)
OSMOLALITY SERPL CALC.SUM OF ELEC: 291 MOSM/KG (ref 275–295)
P AXIS: 41 DEGREES
P-R INTERVAL: 140 MS
POTASSIUM SERPL-SCNC: 4.1 MMOL/L (ref 3.5–5.1)
PROT SERPL-MCNC: 6.4 G/DL (ref 5.7–8.2)
Q-T INTERVAL: 368 MS
QRS DURATION: 88 MS
QTC CALCULATION (BEZET): 442 MS
R AXIS: 47 DEGREES
SODIUM SERPL-SCNC: 139 MMOL/L (ref 136–145)
T AXIS: 28 DEGREES
TRIGL SERPL-MCNC: 209 MG/DL (ref 30–149)
VENTRICULAR RATE: 87 BPM
VLDLC SERPL CALC-MCNC: 31 MG/DL (ref 0–30)

## 2024-09-16 PROCEDURE — 93010 ELECTROCARDIOGRAM REPORT: CPT | Performed by: INTERNAL MEDICINE

## 2024-09-16 PROCEDURE — 93005 ELECTROCARDIOGRAM TRACING: CPT

## 2024-09-16 PROCEDURE — 80053 COMPREHEN METABOLIC PANEL: CPT

## 2024-09-16 PROCEDURE — 36415 COLL VENOUS BLD VENIPUNCTURE: CPT

## 2024-09-16 PROCEDURE — 80061 LIPID PANEL: CPT

## 2024-11-18 NOTE — TELEPHONE ENCOUNTER
metFORMIN  MG Oral Tablet 24 Hr Take 2 tablets (1,000 mg total) by mouth 2 (two) times daily with meals. 360 tablet 3

## 2024-11-22 RX ORDER — METFORMIN HYDROCHLORIDE 500 MG/1
1000 TABLET, EXTENDED RELEASE ORAL 2 TIMES DAILY WITH MEALS
Qty: 360 TABLET | Refills: 1 | Status: SHIPPED | OUTPATIENT
Start: 2024-11-22

## 2024-11-22 NOTE — TELEPHONE ENCOUNTER
Please review; protocol failed    No future appointments.  Last office visit: 9/10/2024    Requested Prescriptions   Pending Prescriptions Disp Refills    metFORMIN  MG Oral Tablet 24 Hr 360 tablet 3     Sig: Take 2 tablets (1,000 mg total) by mouth 2 (two) times daily with meals.       Diabetes Medication Protocol Failed - 11/22/2024  8:48 AM        Failed - Last A1C < 7.5 and within past 6 months     Lab Results   Component Value Date    A1C 8.0 (A) 09/10/2024             Passed - In person appointment or virtual visit in the past 6 mos or appointment in next 3 mos     Recent Outpatient Visits              2 months ago Type 2 diabetes mellitus without complication, without long-term current use of insulin ([facility])    [facility] [place]    Office Visit    7 months ago Wellness examination    [facility], [address], [place] Sarah Morales MD    Office Visit    1 year ago Type 2 diabetes mellitus with stage 2 chronic kidney disease, without long-term current use of insulin ([facility])    [facility], [address], [place] Sarah Morales MD    Office Visit    1 year ago Type 2 diabetes mellitus with stage 2 chronic kidney disease, without long-term current use of insulin ([facility])    [facility], [address], [place] Sarah Morales MD    Office Visit    1 year ago Wellness examination    [facility], [address], [place] Sarah Morales MD    Office Visit                      Passed - Microalbumin procedure in past 12 months or taking ACE/ARB        Passed - EGFRCR or GFRNAA > 50     GFR Evaluation  EGFRCR: 62 , resulted on 9/16/2024          Passed - GFR in the past 12 months               Recent Outpatient Visits              2 months ago Type 2 diabetes mellitus without complication, without long-term current use of insulin ([facility])    [facility], [address], [place] Yakov Moreno PA-C    Office Visit    7 months ago Wellness examination    [facility], [address], [place] Sarah Morales MD    Office Visit    1 year ago Type 2 diabetes mellitus with stage 2 chronic kidney disease, without long-term current use of insulin ([facility])    [facility], [address], [place] Sarah Morales MD    Office Visit    1 year ago Type 2 diabetes mellitus with stage 2 chronic kidney disease, without long-term current use of insulin ([facility])    [facility], [address], [place] Sarah Morales MD    Office Visit    1 year ago Wellness examination    [facility], [address], [place] Sarah Morales MD    Office Visit

## 2024-11-26 PROCEDURE — 82274 ASSAY TEST FOR BLOOD FECAL: CPT | Performed by: PHYSICIAN ASSISTANT

## 2024-12-03 LAB — HEMOCCULT STL QL: NEGATIVE

## 2024-12-13 ENCOUNTER — TELEPHONE (OUTPATIENT)
Dept: CASE MANAGEMENT | Age: 59
End: 2024-12-13

## 2024-12-13 DIAGNOSIS — H40.023 OPEN ANGLE WITH BORDERLINE FINDINGS AND HIGH GLAUCOMA RISK IN BOTH EYES: Primary | ICD-10-CM

## 2024-12-13 NOTE — TELEPHONE ENCOUNTER
Yakov Moreno, *    Dr Roman office requesting referral for office visit for glaucoma.    Pended referral please review diagnosis and sign off if you agree.    Thank you.  Radha Herrera  [facility]

## 2024-12-23 RX ORDER — TIRZEPATIDE 7.5 MG/.5ML
7.5 INJECTION, SOLUTION SUBCUTANEOUS WEEKLY
Qty: 6 ML | Refills: 0 | Status: CANCELLED | OUTPATIENT
Start: 2024-12-23

## 2024-12-26 DIAGNOSIS — E11.9 TYPE 2 DIABETES MELLITUS WITHOUT COMPLICATION, WITHOUT LONG-TERM CURRENT USE OF INSULIN (HCC): ICD-10-CM

## 2024-12-30 RX ORDER — TIRZEPATIDE 5 MG/.5ML
5 INJECTION, SOLUTION SUBCUTANEOUS WEEKLY
Qty: 6 ML | Refills: 0 | Status: SHIPPED | OUTPATIENT
Start: 2024-12-30

## 2024-12-30 NOTE — TELEPHONE ENCOUNTER
Please review; protocol failed/ has no protocol      Please see patients MyChart Message.  osgonzaloh W Mono  P [facility] (supporting Mayte Sales CPhT)7 days ago       7.5 too strong i think, I'd like the 5.0 if possible. Weight is at 186. Nice side effect    Which GLP is the patient on: Mounjaro   Current dose: 7.5 mg  Patient seeking refill or increase to next dose: dose decrease to Mounjaro 5 mg   How many doses of current dose completed: 4  Side effects, if any: none  Current weight / how much weight loss: 186 lbs  Last visit: 09/10/2024          Requested Prescriptions   Pending Prescriptions Disp Refills    Tirzepatide (MOUNJARO) 5 MG/0.5ML Subcutaneous Solution Auto-injector 6 mL 0     Sig: Inject 5 mg into the skin once a week.       Diabetes Medication Protocol Failed - 12/30/2024 11:29 AM        Failed - Last A1C < 7.5 and within past 6 months     Lab Results   Component Value Date    A1C 8.0 (A) 09/10/2024             Passed - In person appointment or virtual visit in the past 6 mos or appointment in next 3 mos     Recent Outpatient Visits              3 months ago Type 2 diabetes mellitus without complication, without long-term current use of insulin ([facility])    [facility] [place]    Office Visit    8 months ago Wellness examination    [facility] [address], [place] Sarah Morales MD    Office Visit    1 year ago Type 2 diabetes mellitus with stage 2 chronic kidney disease, without long-term current use of insulin ([facility])    [facility], [address], [place] Sarah Morales MD    Office Visit    1 year ago Type 2 diabetes mellitus with stage 2 chronic kidney disease, without long-term current use of insulin ([facility])    [facility], [address], [place] Sarah Morales MD    Office Visit    1 year ago Wellness examination    [facility]  [address], [place] Sarah Morales MD    Office Visit                      Passed - Microalbumin procedure in past 12 months or taking ACE/ARB        Passed - EGFRCR or GFRNAA > 50     GFR Evaluation  EGFRCR: 62 , resulted on 9/16/2024          Passed - GFR in the past 12 months           Recent Outpatient Visits              3 months ago Type 2 diabetes mellitus without complication, without long-term current use of insulin ([facility])    [facility], [address], [place] Yakov Moreno PA-C    Office Visit    8 months ago Wellness examination    [facility], [address], [place] Sarah Morales MD    Office Visit    1 year ago Type 2 diabetes mellitus with stage 2 chronic kidney disease, without long-term current use of insulin ([facility])    [facility], [address], [place] Sarah Morales MD    Office Visit    1 year ago Type 2 diabetes mellitus with stage 2 chronic kidney disease, without long-term current use of insulin ([facility])    [facility], [address], [place] Sarah Morales MD    Office Visit    1 year ago Wellness examination    [facility], [address], [place] Sarah Morales MD    Office Visit

## 2024-12-31 RX ORDER — TIRZEPATIDE 7.5 MG/.5ML
7.5 INJECTION, SOLUTION SUBCUTANEOUS WEEKLY
Qty: 6 ML | Refills: 0 | OUTPATIENT
Start: 2024-12-31

## 2025-02-07 ENCOUNTER — LAB ENCOUNTER (OUTPATIENT)
Dept: LAB | Age: 60
End: 2025-02-07
Attending: PHYSICIAN ASSISTANT
Payer: COMMERCIAL

## 2025-02-07 DIAGNOSIS — E11.9 TYPE 2 DIABETES MELLITUS WITHOUT COMPLICATION, WITHOUT LONG-TERM CURRENT USE OF INSULIN (HCC): ICD-10-CM

## 2025-02-07 LAB
ANION GAP SERPL CALC-SCNC: 6 MMOL/L (ref 0–18)
BUN BLD-MCNC: 15 MG/DL (ref 9–23)
BUN/CREAT SERPL: 10.6 (ref 10–20)
CALCIUM BLD-MCNC: 8.8 MG/DL (ref 8.7–10.4)
CHLORIDE SERPL-SCNC: 106 MMOL/L (ref 98–112)
CO2 SERPL-SCNC: 28 MMOL/L (ref 21–32)
CREAT BLD-MCNC: 1.42 MG/DL
EGFRCR SERPLBLD CKD-EPI 2021: 57 ML/MIN/1.73M2 (ref 60–?)
EST. AVERAGE GLUCOSE BLD GHB EST-MCNC: 117 MG/DL (ref 68–126)
FASTING STATUS PATIENT QL REPORTED: YES
GLUCOSE BLD-MCNC: 135 MG/DL (ref 70–99)
HBA1C MFR BLD: 5.7 % (ref ?–5.7)
OSMOLALITY SERPL CALC.SUM OF ELEC: 293 MOSM/KG (ref 275–295)
POTASSIUM SERPL-SCNC: 4.6 MMOL/L (ref 3.5–5.1)
SODIUM SERPL-SCNC: 140 MMOL/L (ref 136–145)

## 2025-02-07 PROCEDURE — 83036 HEMOGLOBIN GLYCOSYLATED A1C: CPT

## 2025-02-07 PROCEDURE — 80048 BASIC METABOLIC PNL TOTAL CA: CPT

## 2025-02-07 PROCEDURE — 36415 COLL VENOUS BLD VENIPUNCTURE: CPT

## 2025-02-18 ENCOUNTER — TELEPHONE (OUTPATIENT)
Dept: INTERNAL MEDICINE CLINIC | Facility: CLINIC | Age: 60
End: 2025-02-18

## 2025-02-18 NOTE — TELEPHONE ENCOUNTER
Tirzepatide (MOUNJARO) 5 MG/0.5ML Subcutaneous Solution Auto-injector, Inject 5 mg into the skin once a week., Disp: 6 mL, Rfl: 0

## 2025-02-18 NOTE — TELEPHONE ENCOUNTER
Mounjaro 5mg: 3 month supply sent to [facility] in [place] on 12/30/2024

## 2025-04-07 ENCOUNTER — OFFICE VISIT (OUTPATIENT)
Dept: INTERNAL MEDICINE CLINIC | Facility: CLINIC | Age: 60
End: 2025-04-07
Payer: COMMERCIAL

## 2025-04-07 VITALS
WEIGHT: 186 LBS | BODY MASS INDEX: 24.65 KG/M2 | HEIGHT: 73 IN | SYSTOLIC BLOOD PRESSURE: 143 MMHG | DIASTOLIC BLOOD PRESSURE: 77 MMHG | HEART RATE: 74 BPM

## 2025-04-07 DIAGNOSIS — E11.9 TYPE 2 DIABETES MELLITUS WITHOUT COMPLICATION, WITHOUT LONG-TERM CURRENT USE OF INSULIN (HCC): ICD-10-CM

## 2025-04-07 DIAGNOSIS — Z00.00 ANNUAL PHYSICAL EXAM: Primary | ICD-10-CM

## 2025-04-07 DIAGNOSIS — M79.671 RIGHT FOOT PAIN: ICD-10-CM

## 2025-04-07 DIAGNOSIS — F33.41 RECURRENT MAJOR DEPRESSIVE DISORDER, IN PARTIAL REMISSION: ICD-10-CM

## 2025-04-07 DIAGNOSIS — Z12.5 SCREENING FOR PROSTATE CANCER: ICD-10-CM

## 2025-04-07 DIAGNOSIS — M75.01 ADHESIVE CAPSULITIS OF RIGHT SHOULDER: ICD-10-CM

## 2025-04-07 RX ORDER — EMPAGLIFLOZIN 10 MG/1
TABLET, FILM COATED ORAL
COMMUNITY

## 2025-04-07 NOTE — PROGRESS NOTES
Abhijit Villareal is a 59 year old male.  Chief Complaint   Patient presents with    Physical     HPI:    Patient presented today for annual physical.    He c/o pain in his R shoulder. States his activity is limited during the work hours, as he is a . Unable to stretch it to the back or above his head. Has been trying tylenol as needed. No numbness.    Also states that he feels like a tightening and skin thickening in his R foot. He has had injury to his R foot and since than he has had altered sensations. No edema or worsening symptoms.    Has been on his diabetes medications and monitoring his diet as well. Recent labs reviewed with the patient.    Noticed a keloid scar on his back. Had a wound here which has resolved but now has a scar. Not worsening, not growing, no pain or tenderness.    Has h/o over dose on gabapentin and valiums. States does not have any suicidal ideations anymore. Life has been better. Last attempt was in 2022. Not on citalopram anymore.     Current Outpatient Medications   Medication Sig Dispense Refill    Metoprolol-HCTZ -12.5 MG Oral Tablet 24 Hr       JARDIANCE 10 MG Oral Tab       Rosuvastatin Calcium 10 MG Oral Capsule Sprinkle       Tirzepatide (MOUNJARO) 5 MG/0.5ML Subcutaneous Solution Auto-injector Inject 5 mg into the skin once a week. 6 mL 0    metFORMIN  MG Oral Tablet 24 Hr Take 2 tablets (1,000 mg total) by mouth 2 (two) times daily with meals. 360 tablet 1      Past Medical History:    Calculus of kidney    Diabetes (HCC)    Essential hypertension    Hyperlipidemia    Multiple fractures of fingers    Sciatica      Past Surgical History:   Procedure Laterality Date    Cystoscopy,insert ureteral stent      Lasik Bilateral 2003    LASIK Plus    Lithotripsy        Social History:  Social History     Socioeconomic History    Marital status: Single   Tobacco Use    Smoking status: Never    Smokeless tobacco: Never   Vaping Use    Vaping status: Never Used    Substance and Sexual Activity    Alcohol use: Yes     Alcohol/week: 6.0 standard drinks of alcohol     Types: 6 Cans of beer per week     Comment: occ BEER    Drug use: No   Other Topics Concern    Caffeine Concern Yes     Comment: Soda > 6 cups daily     Social Drivers of Health      [facility]      Family History   Problem Relation Age of Onset    Diabetes Father     Heart Disease Father     Diabetes Mother     Breast Cancer Mother     Hypertension Mother     Melanoma Sister     Glaucoma Neg       Allergies[1]     REVIEW OF SYSTEMS:   Review of Systems   Review of Systems   Constitutional: Negative for activity change, appetite change and fever.   HENT: Negative for congestion and voice change.    Respiratory: Negative for cough and shortness of breath.    Cardiovascular: Negative for chest pain.   Gastrointestinal: Negative for abdominal distention, abdominal pain and vomiting.   Genitourinary: Negative for hematuria.   Skin: Negative for wound.   Psychiatric/Behavioral: Negative for behavioral problems.   Wt Readings from Last 5 Encounters:   04/07/25 186 lb (84.4 kg)   09/10/24 202 lb (91.6 kg)   04/08/24 208 lb 1.6 oz (94.4 kg)   10/09/23 212 lb 11.2 oz (96.5 kg)   08/30/23 209 lb (94.8 kg)     Body mass index is 24.54 kg/m².      EXAM:   /77   Pulse 74   Ht 6' 1\" (1.854 m)   Wt 186 lb (84.4 kg)   BMI 24.54 kg/m²   Physical Exam   Constitutional:       Appearance: Normal appearance.   HENT:      Head: Normocephalic.   Eyes:      Conjunctiva/sclera: Conjunctivae normal.   Breast:  Normal bilateral breast exam. No palpable masses or nodules.   No nipples asymmetry or discharge. No skin changes   Cardiovascular:      Rate and Rhythm: Normal rate and regular rhythm.      Heart sounds: Normal heart sounds. No murmur heard.  Pulmonary:      Effort: Pulmonary effort is normal.      Breath sounds: Normal breath sounds. No rhonchi or rales.   Abdominal:       General: Bowel sounds are normal.      Palpations: Abdomen is soft.      Tenderness: There is no abdominal tenderness.   Musculoskeletal:      Cervical back: Neck supple.      Right lower leg: No edema.      Left lower leg: No edema.   - limited range of motion of R upper extremities, no weaness  Skin:     General: Skin is warm and dry.   Left shoulder blade with a 2 cm keloid scar, no underlying fluid collection, no tenderness  Neurological:      General: No focal deficit present.      Mental Status: He is alert and oriented to person, place, and time. Mental status is at baseline.   Psychiatric:         Mood and Affect: Mood normal.         Behavior: Behavior normal.     Bilateral barefoot skin diabetic exam is normal, visualized feet and the appearance is normal.  Bilateral monofilament/sensation of both feet is normal.  Pulsation pedal pulse exam of both lower legs/feet is normal as well.       ASSESSMENT AND PLAN:     Assessment & Plan  Annual physical exam  - recent labs reviewed  - immunizations reviewed  - general diet and exercise counseling   Orders:    TSH W Reflex To Free T4 [E]; Future    Adhesive capsulitis of right shoulder  - stretching exercises  - tylenol as needed  - will consider pm&r eval if no improvement   Orders:    PHYSICAL THERAPY - INTERNAL    Screening for prostate cancer    Orders:    PSA (Screening) [E]; Future    Type 2 diabetes mellitus without complication, without long-term current use of insulin (HCC)  - continue with jardiance, metformin and mounjaro   - check labs   Orders:    Microalb/Creat Ratio, Random Urine; Future    Recurrent major depressive disorder, in partial remission  - stable  - will avoid gabapentin, given h/o overdose        Right foot pain  - tylenol as needed       The patient indicates understanding of these issues and agrees to the plan.  Follow up in 3 mnths     Asha Johnson MD     This note was created by Dragon voice recognition. Errors in content may  be related to improper recognition by the system; efforts to review and correct have been done but errors may still exist. Please be advised the primary purpose of this note is for me to communicate medical care. Standard sentence structure is not always used. Medical terminology and medical abbreviations may be used. There may be grammatical, typographical, and automated fill ins that may have errors missed in proofreading.        [1]   Allergies  Allergen Reactions    Sulfa Antibiotics HIVES

## 2025-04-07 NOTE — ASSESSMENT & PLAN NOTE
- continue with jardiance, metformin and mounjaro   - check labs   Orders:    Microalb/Creat Ratio, Random Urine; Future

## 2025-04-14 ENCOUNTER — LAB ENCOUNTER (OUTPATIENT)
Dept: LAB | Age: 60
End: 2025-04-14
Attending: INTERNAL MEDICINE
Payer: COMMERCIAL

## 2025-04-14 DIAGNOSIS — Z12.5 SCREENING FOR PROSTATE CANCER: ICD-10-CM

## 2025-04-14 DIAGNOSIS — E11.9 TYPE 2 DIABETES MELLITUS WITHOUT COMPLICATION, WITHOUT LONG-TERM CURRENT USE OF INSULIN (HCC): ICD-10-CM

## 2025-04-14 DIAGNOSIS — Z00.00 ANNUAL PHYSICAL EXAM: ICD-10-CM

## 2025-04-14 LAB
COMPLEXED PSA SERPL-MCNC: 1.2 NG/ML (ref ?–4)
CREAT UR-SCNC: 139.2 MG/DL
MICROALBUMIN UR-MCNC: 0.3 MG/DL
MICROALBUMIN/CREAT 24H UR-RTO: 2.2 UG/MG (ref ?–30)
TSI SER-ACNC: 2.68 UIU/ML (ref 0.55–4.78)

## 2025-04-14 PROCEDURE — 36415 COLL VENOUS BLD VENIPUNCTURE: CPT

## 2025-04-14 PROCEDURE — 82043 UR ALBUMIN QUANTITATIVE: CPT

## 2025-04-14 PROCEDURE — 84443 ASSAY THYROID STIM HORMONE: CPT

## 2025-04-14 PROCEDURE — 82570 ASSAY OF URINE CREATININE: CPT

## 2025-04-25 RX ORDER — TIRZEPATIDE 5 MG/.5ML
5 INJECTION, SOLUTION SUBCUTANEOUS WEEKLY
Qty: 4 ML | Refills: 0 | OUTPATIENT
Start: 2025-04-25

## 2025-04-25 NOTE — TELEPHONE ENCOUNTER
Spoke to Sharita, patient has a prescription ready currently at the pharmacy for Mounjaro, with 2 refills remaining on file.     Pharmacy states that refill is ready for patient to .     Sent Multifondst message to patient.    yes

## 2025-04-28 ENCOUNTER — HOSPITAL ENCOUNTER (OUTPATIENT)
Age: 60
Discharge: HOME | End: 2025-04-28
Payer: COMMERCIAL

## 2025-04-28 ENCOUNTER — APPOINTMENT (OUTPATIENT)
Dept: GENERAL RADIOLOGY | Age: 60
End: 2025-04-28
Attending: NURSE PRACTITIONER
Payer: COMMERCIAL

## 2025-04-28 VITALS
SYSTOLIC BLOOD PRESSURE: 132 MMHG | RESPIRATION RATE: 16 BRPM | TEMPERATURE: 98 F | HEART RATE: 91 BPM | DIASTOLIC BLOOD PRESSURE: 80 MMHG | OXYGEN SATURATION: 98 %

## 2025-04-28 DIAGNOSIS — M79.672 LEFT FOOT PAIN: Primary | ICD-10-CM

## 2025-04-28 DIAGNOSIS — M72.2 PLANTAR FASCIITIS: ICD-10-CM

## 2025-04-28 PROCEDURE — 99213 OFFICE O/P EST LOW 20 MIN: CPT

## 2025-04-28 PROCEDURE — 73630 X-RAY EXAM OF FOOT: CPT | Performed by: NURSE PRACTITIONER

## 2025-04-28 PROCEDURE — 99204 OFFICE O/P NEW MOD 45 MIN: CPT

## 2025-04-28 RX ORDER — PREDNISONE 20 MG/1
40 TABLET ORAL DAILY
Qty: 10 TABLET | Refills: 0 | Status: SHIPPED | OUTPATIENT
Start: 2025-04-28 | End: 2025-05-03

## 2025-04-28 NOTE — ED PROVIDER NOTES
Patient Seen in: [facility] [place]      History     Chief Complaint   Patient presents with    Foot Pain     Stated Complaint: L ankle pain    Subjective:   HPI    59-year-old male presents to the immediate care with pain to the bottom mid aspect of the left foot.  It does come and go and occurs with weightbearing.  No trauma or fall.  Pain occasionally radiates to the ankle no redness no warmth no history of gout  History of Present Illness               Objective:     Past Medical History:    Calculus of kidney    Diabetes (HCC)    Essential hypertension    Hyperlipidemia    Multiple fractures of fingers    Sciatica              Past Surgical History:   Procedure Laterality Date    Cystoscopy,insert ureteral stent      Lasik Bilateral 2003    LASIK Plus    Lithotripsy                  No pertinent social history.            Review of Systems    Positive for stated complaint: L ankle pain  Other systems are as noted in HPI.  Constitutional and vital signs reviewed.      All other systems reviewed and negative except as noted above.                  Physical Exam     ED Triage Vitals [04/28/25 1447]   /80   Pulse 91   Resp 16   Temp 98.2 °F (36.8 °C)   Temp src Oral   SpO2 98 %   O2 Device None (Room air)       Current Vitals:   Vital Signs  BP: 132/80  Pulse: 91  Resp: 16  Temp: 98.2 °F (36.8 °C)  Temp src: Oral    Oxygen Therapy  SpO2: 98 %  O2 Device: None (Room air)        Physical Exam  Vitals and nursing note reviewed.   Constitutional:       Appearance: Normal appearance.   Cardiovascular:      Rate and Rhythm: Normal rate.      Pulses: Normal pulses.   Musculoskeletal:         General: Tenderness present. No swelling or deformity. Normal range of motion.      Comments: Tenderness to palpation to the left plantar aspect of the mid foot minimal pain to the ball of the foot there is no calcaneal pain no bony malleolus tenderness neurovascularly intact   Neurological:      General: No focal deficit  present.      Mental Status: He is alert.           Physical Exam                ED Course   Labs Reviewed - No data to display  X-ray rule out acute bony process     Results                                 MDM      Sprain strain plantar fasciitis rule out fracture  X-ray with mild osteoarthrosis but no stress fracture suspect plantar fasciitis  Ice  Tylenol, ibuprofen, podiatry follow-up return for concern      Medical Decision Making  Problems Addressed:  Left foot pain: acute illness or injury  Plantar fasciitis: acute illness or injury    Amount and/or Complexity of Data Reviewed  Radiology: ordered. Decision-making details documented in ED Course.    Risk  OTC drugs.  Prescription drug management.        Disposition and Plan     Clinical Impression:  1. Left foot pain    2. Plantar fasciitis         Disposition:  Discharge  4/28/2025  3:53 pm    Follow-up:  Elias Yan DPM  [address]  [place] [address] [phone]    Schedule an appointment as soon as possible for a visit             Medications Prescribed:  Discharge Medication List as of 4/28/2025  3:55 PM        START taking these medications    Details   predniSONE 20 MG Oral Tab Take 2 tablets (40 mg total) by mouth daily for 5 days., Normal, Disp-10 tablet, R-0             Supplementary Documentation:

## 2025-05-21 DIAGNOSIS — E11.9 TYPE 2 DIABETES MELLITUS WITHOUT COMPLICATION, WITHOUT LONG-TERM CURRENT USE OF INSULIN (HCC): ICD-10-CM

## 2025-05-22 ENCOUNTER — PATIENT MESSAGE (OUTPATIENT)
Dept: INTERNAL MEDICINE CLINIC | Facility: CLINIC | Age: 60
End: 2025-05-22

## 2025-05-22 DIAGNOSIS — E11.9 TYPE 2 DIABETES MELLITUS WITHOUT COMPLICATION, WITHOUT LONG-TERM CURRENT USE OF INSULIN (HCC): ICD-10-CM

## 2025-05-22 RX ORDER — EMPAGLIFLOZIN 10 MG/1
10 TABLET, FILM COATED ORAL DAILY
Qty: 90 TABLET | Refills: 0 | OUTPATIENT
Start: 2025-05-22

## 2025-05-22 NOTE — TELEPHONE ENCOUNTER
Refill passes per [facility] protocol.    No future appointments with primary care medicine    last office visit: 4/7/2025    Prescription fails for medication not being active on patient's med list - prescription \"fell off list\" due to duration being entered.

## 2025-05-23 RX ORDER — METOPROLOL SUCCINATE 25 MG/1
25 TABLET, EXTENDED RELEASE ORAL DAILY
Qty: 90 TABLET | Refills: 3 | Status: SHIPPED | OUTPATIENT
Start: 2025-05-23 | End: 2026-05-18

## 2025-05-23 NOTE — TELEPHONE ENCOUNTER
Refill failed [facility] protocol. Please review     Patient comment : I did check with them and there's no more prescriptions.So that's why I requested a new prescription from my doctor so I can get a prescription

## 2025-05-23 NOTE — TELEPHONE ENCOUNTER
Metoprolol ER 25 MG 1 year supply sent 9/20/24 to [facility] [place]    Sent Net Transmit & Receivehart message to patient.

## 2025-06-12 ENCOUNTER — APPOINTMENT (OUTPATIENT)
Dept: GENERAL RADIOLOGY | Age: 60
End: 2025-06-12
Attending: PHYSICIAN ASSISTANT
Payer: COMMERCIAL

## 2025-06-12 ENCOUNTER — HOSPITAL ENCOUNTER (OUTPATIENT)
Age: 60
Discharge: HOME | End: 2025-06-12
Payer: COMMERCIAL

## 2025-06-12 ENCOUNTER — APPOINTMENT (OUTPATIENT)
Dept: ULTRASOUND IMAGING | Age: 60
End: 2025-06-12
Attending: PHYSICIAN ASSISTANT
Payer: COMMERCIAL

## 2025-06-12 VITALS
DIASTOLIC BLOOD PRESSURE: 87 MMHG | HEART RATE: 104 BPM | RESPIRATION RATE: 18 BRPM | OXYGEN SATURATION: 99 % | SYSTOLIC BLOOD PRESSURE: 129 MMHG | TEMPERATURE: 98 F

## 2025-06-12 DIAGNOSIS — S86.812A STRAIN OF CALF MUSCLE, LEFT, INITIAL ENCOUNTER: ICD-10-CM

## 2025-06-12 DIAGNOSIS — S93.402A MILD SPRAIN OF LEFT ANKLE, INITIAL ENCOUNTER: Primary | ICD-10-CM

## 2025-06-12 PROCEDURE — 99213 OFFICE O/P EST LOW 20 MIN: CPT

## 2025-06-12 PROCEDURE — 99214 OFFICE O/P EST MOD 30 MIN: CPT

## 2025-06-12 PROCEDURE — 93971 EXTREMITY STUDY: CPT | Performed by: PHYSICIAN ASSISTANT

## 2025-06-12 PROCEDURE — 73610 X-RAY EXAM OF ANKLE: CPT | Performed by: PHYSICIAN ASSISTANT
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
## 2025-06-12 NOTE — ED QUICK NOTES
Results discussed directly with patient while patient was present. Any further details documented in the note.   Kristi Webb MD Xray and Ultrasound completed, results pending.
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
## 2025-07-09 ENCOUNTER — TELEPHONE (OUTPATIENT)
Dept: FAMILY MEDICINE CLINIC | Facility: CLINIC | Age: 60
End: 2025-07-09

## 2025-08-25 DIAGNOSIS — E78.00 HIGH CHOLESTEROL: ICD-10-CM

## 2025-08-29 RX ORDER — ROSUVASTATIN CALCIUM 20 MG/1
20 TABLET, COATED ORAL NIGHTLY
Qty: 90 TABLET | Refills: 1 | Status: SHIPPED | OUTPATIENT
Start: 2025-08-29 | End: 2026-02-25

## (undated) DIAGNOSIS — E78.00 HIGH CHOLESTEROL: ICD-10-CM

## (undated) DIAGNOSIS — E11.9 DIABETES MELLITUS WITHOUT COMPLICATION (HCC): ICD-10-CM

## (undated) DEVICE — SOL H2O 3000ML IRRIG

## (undated) DEVICE — CATH URET CONE TIP 8FR 138008

## (undated) DEVICE — COVER SGL STRL LGHT HNDL BLU

## (undated) DEVICE — STERILE LATEX POWDER-FREE SURGICAL GLOVESWITH NITRILE COATING: Brand: PROTEXIS

## (undated) DEVICE — CYSTO PACK: Brand: MEDLINE INDUSTRIES, INC.

## (undated) DEVICE — LUBRICANT JLY SURGILUBE 2OZ

## (undated) DEVICE — NITINOL WIRE STR 035

## (undated) DEVICE — 12 ML SYRINGE LUER-LOCK TIP: Brand: MONOJECT

## (undated) DEVICE — STERILE POLYISOPRENE POWDER-FREE SURGICAL GLOVES: Brand: PROTEXIS

## (undated) DEVICE — UROLOGY DRAIN BAG STERILE

## (undated) DEVICE — SOL H2O 1000ML BTL

## (undated) DEVICE — KENDALL SCD EXPRESS SLEEVES, KNEE LENGTH, MEDIUM: Brand: KENDALL SCD

## (undated) DEVICE — MEDI-VAC NON-CONDUCTIVE SUCTION TUBING: Brand: CARDINAL HEALTH

## (undated) DEVICE — SOL  .9 3000ML

## (undated) DEVICE — SOL  .9 1000ML BTL

## (undated) DEVICE — ISOVUE 300 10X100ML VIAL

## (undated) NOTE — IP AVS SNAPSHOT
[address]  [address] [facility] ~ [phone]                Discharge Summary   6/1/2017    Mr. Anna Pro           Admission Information        Provider Department    6/1/2017 Andrey Nelson MD [facility] [address] [phone], [phone]  [address] [place] [address], [place] [address] [address]     Phone:  [phone]    - Alfuzosin HCl ER 10 MG Tb24  - levofloxacin 500 MG Tabs      Please  your prescriptions -Please strain all urine. Follow-up Information     Follow up with Cheyenne Nelson MD In 2 weeks.     Specialty:  NEPHROLOGY    Contact information:    [address]  [phone]          Follow up with Feliciano Roblero MD. Sharmaine Maher 21 (H) (06/04/17)  1.64 (H) (06/04/17)  8.9 (06/01/17)  72 (06/01/17)  44 (H)      Metabolic Lab Results  (Last result in the past 90 days)    ALT Bilirubin,Total Total Protein Albumin Sodium Potassium Chloride    (06/01/17)  66 (H) (06/01/17)  0.8 (06/01/ prescribed to take and their potential SIDE EFFECTS. Your nurse will review your medications with you before you are discharged, and can provide you with additional printed information.  Not all patients will experience these side effects or respond to [facility]

## (undated) NOTE — LETTER
[facility]  [address] [address]      6/26/201              [address]         Dear Judson Galan,      It was lexa tafoya

## (undated) NOTE — Clinical Note
Thank you for the consult. I saw Mr. Toan Webb in the endocrine/diabetes clinic today. Please see attached my note. Please feel free to contact me with any questions. Thanks!

## (undated) NOTE — MR AVS SNAPSHOT
[facility]  [address] [place] [address], [address] [place] [address]  Dario Paredesstein               Thank you for choosing us for your health care visit with Yaneth Spencer. DO Darline.   We are glad to serve you and happy to provide you with this summary If you have questions, you can call [phone] to talk to our [facility] Staff. Remember, [employer] is NOT to be used for urgent needs. For medical emergencies, dial 911. Visit [email or web address] to learn more.         Educational Infor

## (undated) NOTE — LETTER
Hospital Discharge Documentation  Please phone to schedule a hospital follow up appointment.     From: [address] Hospitalist's Office  Phone: [phone]    Patient discharged time/date: 6/4/2017  3:47 PM  Patient discharge disposition:  Home or Self CV: Heart with regular rate and rhythm  Abd: Abdomen soft, nontender, nondistended, bowel sounds present  Neuro: No acute focal deficits  MSK: Full range of motion in extremities  Skin: Warm and dry  Psych: Normal affect  Ext: no c/c/e      History of Pres Take 1 tablet by mouth every 4 (four) hours as needed. Quantity:  20 tablet   Refills:  0       levofloxacin 500 MG Tabs   Commonly known as:  LEVAQUIN        Take 1 tablet (500 mg total) by mouth every other day.     Stop taking on:  6/11/2017   Willy Wells

## (undated) NOTE — LETTER
04/16/20        [address] [facility]      Dear Roxana Michelle records indicate that you have outstanding lab work and or testing that was ordered for you and has not yet been completed:  Orders Placed This Encounter      C